# Patient Record
Sex: FEMALE | Race: OTHER | NOT HISPANIC OR LATINO | Employment: FULL TIME | URBAN - METROPOLITAN AREA
[De-identification: names, ages, dates, MRNs, and addresses within clinical notes are randomized per-mention and may not be internally consistent; named-entity substitution may affect disease eponyms.]

---

## 2023-02-24 PROBLEM — M79.89 SWELLING OF RIGHT HAND: Status: ACTIVE | Noted: 2023-02-24

## 2023-02-24 PROBLEM — R31.9 HEMATURIA: Status: ACTIVE | Noted: 2023-02-24

## 2023-02-24 PROBLEM — R09.81 NASAL CONGESTION: Status: ACTIVE | Noted: 2023-02-24

## 2023-02-24 PROBLEM — R06.7 SNEEZING: Status: ACTIVE | Noted: 2023-02-24

## 2023-02-24 PROBLEM — H90.3 BILATERAL SENSORINEURAL HEARING LOSS: Status: ACTIVE | Noted: 2023-02-24

## 2023-02-24 PROBLEM — R35.0 INCREASED FREQUENCY OF URINATION: Status: ACTIVE | Noted: 2023-02-24

## 2023-02-24 PROBLEM — M25.512 CHRONIC LEFT SHOULDER PAIN: Status: ACTIVE | Noted: 2023-02-24

## 2023-02-24 PROBLEM — F41.9 ANXIETY: Status: ACTIVE | Noted: 2023-02-24

## 2023-02-24 PROBLEM — G89.29 CHRONIC LEFT SHOULDER PAIN: Status: ACTIVE | Noted: 2023-02-24

## 2023-02-24 PROBLEM — L20.84 INTRINSIC ECZEMA: Status: ACTIVE | Noted: 2023-02-24

## 2023-02-24 PROBLEM — M62.838 MUSCLE SPASM: Status: ACTIVE | Noted: 2023-02-24

## 2023-02-24 PROBLEM — E55.9 VITAMIN D DEFICIENCY: Status: ACTIVE | Noted: 2023-02-24

## 2023-02-24 PROBLEM — H93.13 SUBJECTIVE TINNITUS OF BOTH EARS: Status: ACTIVE | Noted: 2023-02-24

## 2023-02-24 PROBLEM — M54.2 NECK PAIN: Status: ACTIVE | Noted: 2023-02-24

## 2023-02-24 PROBLEM — N89.8 VAGINAL LESION: Status: ACTIVE | Noted: 2023-02-24

## 2023-02-24 PROBLEM — M25.512 LEFT SHOULDER PAIN: Status: ACTIVE | Noted: 2023-02-24

## 2023-02-24 PROBLEM — M79.641 PAIN OF RIGHT HAND: Status: ACTIVE | Noted: 2023-02-24
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
## 2023-02-24 PROBLEM — R63.4 WEIGHT LOSS, UNINTENTIONAL: Status: ACTIVE | Noted: 2023-02-24

## 2023-02-24 PROBLEM — A60.00 GENITAL HERPES: Status: ACTIVE | Noted: 2023-02-24

## 2023-02-24 PROBLEM — D64.9 ANEMIA: Status: ACTIVE | Noted: 2023-02-24

## 2023-02-24 PROBLEM — N89.8 VAGINAL IRRITATION: Status: ACTIVE | Noted: 2023-02-24

## 2023-02-24 PROBLEM — M54.9 BACK PAIN: Status: ACTIVE | Noted: 2023-02-24

## 2023-02-24 PROBLEM — R05.9 COUGH: Status: ACTIVE | Noted: 2023-02-24

## 2023-02-24 PROBLEM — H91.93 HEARING LOSS, BILATERAL: Status: ACTIVE | Noted: 2023-02-24

## 2023-02-24 PROBLEM — R13.13 PHARYNGEAL DYSPHAGIA: Status: ACTIVE | Noted: 2023-02-24

## 2023-02-24 RX ORDER — TRETINOIN 0.25 MG/G
CREAM TOPICAL
COMMUNITY
End: 2023-11-30 | Stop reason: ALTCHOICE

## 2023-02-24 RX ORDER — BENZOYL PEROXIDE 50 MG/ML
LIQUID TOPICAL
COMMUNITY
Start: 2022-07-06 | End: 2023-11-30 | Stop reason: ALTCHOICE

## 2023-02-24 RX ORDER — CLINDAMYCIN PHOSPHATE 10 UG/ML
LOTION TOPICAL
COMMUNITY
End: 2023-12-06 | Stop reason: ALTCHOICE

## 2023-03-14 ENCOUNTER — OFFICE VISIT (OUTPATIENT)
Dept: PRIMARY CARE | Facility: CLINIC | Age: 23
End: 2023-03-14
Payer: COMMERCIAL

## 2023-03-14 ENCOUNTER — LAB (OUTPATIENT)
Dept: LAB | Facility: LAB | Age: 23
End: 2023-03-14
Payer: COMMERCIAL

## 2023-03-14 VITALS
BODY MASS INDEX: 20.81 KG/M2 | HEART RATE: 69 BPM | HEIGHT: 70 IN | TEMPERATURE: 97.4 F | WEIGHT: 145.4 LBS | SYSTOLIC BLOOD PRESSURE: 114 MMHG | DIASTOLIC BLOOD PRESSURE: 77 MMHG

## 2023-03-14 DIAGNOSIS — Z00.00 ANNUAL PHYSICAL EXAM: Primary | ICD-10-CM

## 2023-03-14 DIAGNOSIS — Z11.1 SCREENING-PULMONARY TB: ICD-10-CM

## 2023-03-14 DIAGNOSIS — Z00.00 ANNUAL PHYSICAL EXAM: ICD-10-CM

## 2023-03-14 PROCEDURE — 99395 PREV VISIT EST AGE 18-39: CPT | Performed by: INTERNAL MEDICINE

## 2023-03-14 PROCEDURE — 86481 TB AG RESPONSE T-CELL SUSP: CPT

## 2023-03-14 PROCEDURE — 36415 COLL VENOUS BLD VENIPUNCTURE: CPT

## 2023-03-14 RX ORDER — DOXYCYCLINE 50 MG/1
50 TABLET ORAL 2 TIMES DAILY
COMMUNITY
End: 2023-11-30 | Stop reason: ALTCHOICE

## 2023-03-14 ASSESSMENT — ENCOUNTER SYMPTOMS
POLYDIPSIA: 0
SHORTNESS OF BREATH: 0
COUGH: 0
PALPITATIONS: 0
FEVER: 0
CHILLS: 0

## 2023-03-14 NOTE — PROGRESS NOTES
Subjective   Patient ID: Sugey Fonseca is a 22 y.o. female who presents for Annual Exam.    HPI     Review of Systems    Objective   There were no vitals taken for this visit.    Physical Exam    Assessment/Plan

## 2023-03-14 NOTE — PROGRESS NOTES
Subjective   Patient ID: Sugey Fonseca is a 22 y.o. female who presents for Annual Exam.    22-year-old otherwise healthy female presents [facility] for care.  She has a history of major depressive disorder well-controlled without medication.  She says is managed primarily through counseling and talk therapy in the past and that at this time she is in a good state with it has no specific concerns.  I did advise her about means and ways that she can seek similar counseling for treatment options in the future should she have a need including online therapy and services provided through her school.  She is never been on medication in the past for the management of this condition.  She currently follows only with a dermatologist for the management of acne they provide all of her current medications.  She is in otherwise good health active without any symptoms of chronic pain.  As part of today's visit she has a physical form that needs to be completed for her [employer].  I reviewed the details of this form completed as part of today's visit.  This included updated her preventative health screening needs including vaccines and tuberculosis screening.  I counseled the patient about all of these above issues as well as her preventative health needs.    Patient presents today for an annual wellness exam    Medical history and surgical history updated today  Medication list reconciled and updated  Patient denies vision issues at this time  Patient denies hearing issues at this time  Current diet:  Current exercise habit:  Follows with a dentist: Yes    Patient counseled about available preventative health vaccinations and provided with opportunity to update them with our office or through prescription to preferred pharmacy.    Reviewed and discussed preventative health screening recommendations for colon cancer    Reviewed and discussed preventative health recommendations for screening for cervical cancer  "and breast cancer          Review of Systems   Constitutional:  Negative for chills and fever.   Respiratory:  Negative for cough and shortness of breath.    Cardiovascular:  Negative for chest pain and palpitations.   Endocrine: Negative for polydipsia and polyuria.       Objective   /77 (BP Location: Right arm, Patient Position: Sitting, BP Cuff Size: Adult)   Pulse 69   Temp 36.3 °C (97.4 °F) (Temporal)   Ht 1.765 m (5' 9.5\")   Wt 66 kg (145 lb 6.4 oz)   BMI 21.16 kg/m²     Physical Exam  Constitutional:       Appearance: Normal appearance.   HENT:      Head: Normocephalic and atraumatic.      Right Ear: Tympanic membrane normal.      Left Ear: Tympanic membrane normal.      Nose: Nose normal.      Mouth/Throat:      Mouth: Mucous membranes are moist.   Eyes:      Extraocular Movements: Extraocular movements intact.      Conjunctiva/sclera: Conjunctivae normal.      Pupils: Pupils are equal, round, and reactive to light.   Neck:      Thyroid: No thyroid mass, thyromegaly or thyroid tenderness.      Vascular: No carotid bruit.   Cardiovascular:      Rate and Rhythm: Normal rate and regular rhythm.      Heart sounds: No murmur heard.     No friction rub. No gallop.   Pulmonary:      Effort: Pulmonary effort is normal. No respiratory distress.      Breath sounds: No wheezing, rhonchi or rales.   Abdominal:      General: Bowel sounds are normal.      Palpations: Abdomen is soft.      Tenderness: There is no abdominal tenderness. There is no guarding.      Hernia: No hernia is present.   Musculoskeletal:      Cervical back: Neck supple. No tenderness.      Right lower leg: No edema.      Left lower leg: No edema.   Lymphadenopathy:      Cervical: No cervical adenopathy.   Skin:     Coloration: Skin is not jaundiced.   Neurological:      General: No focal deficit present.      Mental Status: She is alert and oriented to person, place, and time.   Psychiatric:         Mood and Affect: Mood normal. "         Assessment/Plan   Problem List Items Addressed This Visit    None  Visit Diagnoses       Annual physical exam    -  Primary    Relevant Orders    T-Spot TB    Screening-pulmonary TB        Relevant Orders    T-Spot TB

## 2023-03-16 LAB
NIL(NEG) CONTROL SPOT COUNT: NORMAL
PANEL A SPOT COUNT: 0
PANEL B SPOT COUNT: 0
POS CONTROL SPOT COUNT: NORMAL
T-SPOT. TB INTERPRETATION: NEGATIVE

## 2023-10-18 ENCOUNTER — APPOINTMENT (OUTPATIENT)
Dept: DERMATOLOGY | Facility: CLINIC | Age: 23
End: 2023-10-18
Payer: COMMERCIAL

## 2023-11-19 DIAGNOSIS — L70.0 ACNE VULGARIS: Primary | ICD-10-CM

## 2023-11-20 RX ORDER — DROSPIRENONE AND ETHINYL ESTRADIOL 0.02-3(28)
1 KIT ORAL DAILY
Qty: 84 TABLET | Refills: 1 | Status: SHIPPED | OUTPATIENT
Start: 2023-11-20 | End: 2023-11-30 | Stop reason: ALTCHOICE

## 2023-11-29 ENCOUNTER — APPOINTMENT (OUTPATIENT)
Dept: DERMATOLOGY | Facility: CLINIC | Age: 23
End: 2023-11-29
Payer: COMMERCIAL

## 2023-11-30 ENCOUNTER — OFFICE VISIT (OUTPATIENT)
Dept: OBSTETRICS AND GYNECOLOGY | Facility: CLINIC | Age: 23
End: 2023-11-30
Payer: COMMERCIAL

## 2023-11-30 VITALS
SYSTOLIC BLOOD PRESSURE: 109 MMHG | DIASTOLIC BLOOD PRESSURE: 64 MMHG | BODY MASS INDEX: 20.04 KG/M2 | HEIGHT: 70 IN | WEIGHT: 140 LBS

## 2023-11-30 DIAGNOSIS — N92.6 MENSTRUAL CYCLE DISORDER: Primary | ICD-10-CM

## 2023-11-30 PROCEDURE — 99213 OFFICE O/P EST LOW 20 MIN: CPT | Performed by: OBSTETRICS & GYNECOLOGY

## 2023-11-30 PROCEDURE — 1036F TOBACCO NON-USER: CPT | Performed by: OBSTETRICS & GYNECOLOGY

## 2023-11-30 RX ORDER — PRENATAL VIT NO.126/IRON/FOLIC 28MG-0.8MG
1 TABLET ORAL DAILY
COMMUNITY
Start: 2022-12-30 | End: 2024-04-23 | Stop reason: SDUPTHER

## 2023-11-30 RX ORDER — TRIAMCINOLONE ACETONIDE 1 MG/G
1 OINTMENT TOPICAL AS NEEDED
COMMUNITY
Start: 2023-08-18 | End: 2023-12-06 | Stop reason: SDUPTHER

## 2023-11-30 RX ORDER — IBUPROFEN 600 MG/1
600 TABLET ORAL AS NEEDED
COMMUNITY
Start: 2023-07-20

## 2023-11-30 RX ORDER — TRETINOIN 0.5 MG/G
1 CREAM TOPICAL NIGHTLY
COMMUNITY
Start: 2022-08-31

## 2023-11-30 ASSESSMENT — PAIN SCALES - GENERAL: PAINLEVEL: 0-NO PAIN

## 2023-12-01 NOTE — PROGRESS NOTES
Patient has been trying to conceive for the last 6 months that she has been unsuccessful so far previously she got pregnant with the same partner and the she uses pot but she does not use heavy drugs her partner apparently uses some additional drugs she is concerned that that may influence quality his sperm  We reviewed physiology of ovulation elements affecting quality of ovulation her cycle is regular once a month lasting for 3 to 5 days I asked patient to download data body temperature cain and start recording basal body temperature so we can review after 3 months of recording if she has the proper ovulation is good quality ovulation  Impression evaluation of fertility return in 3 to 4 months to review basal body temperature record

## 2023-12-02 ENCOUNTER — HOSPITAL ENCOUNTER (EMERGENCY)
Facility: HOSPITAL | Age: 23
Discharge: HOME | End: 2023-12-02
Attending: EMERGENCY MEDICINE
Payer: COMMERCIAL

## 2023-12-02 ENCOUNTER — APPOINTMENT (OUTPATIENT)
Dept: RADIOLOGY | Facility: HOSPITAL | Age: 23
End: 2023-12-02
Payer: COMMERCIAL

## 2023-12-02 VITALS
HEART RATE: 111 BPM | RESPIRATION RATE: 16 BRPM | WEIGHT: 140 LBS | HEIGHT: 69 IN | BODY MASS INDEX: 20.73 KG/M2 | SYSTOLIC BLOOD PRESSURE: 132 MMHG | DIASTOLIC BLOOD PRESSURE: 76 MMHG | OXYGEN SATURATION: 100 % | TEMPERATURE: 97.7 F

## 2023-12-02 DIAGNOSIS — S16.1XXA CERVICAL STRAIN, ACUTE, INITIAL ENCOUNTER: Primary | ICD-10-CM

## 2023-12-02 LAB — PREGNANCY TEST URINE, POC: NEGATIVE

## 2023-12-02 PROCEDURE — 99284 EMERGENCY DEPT VISIT MOD MDM: CPT | Performed by: EMERGENCY MEDICINE

## 2023-12-02 PROCEDURE — 72125 CT NECK SPINE W/O DYE: CPT | Performed by: RADIOLOGY

## 2023-12-02 PROCEDURE — 70450 CT HEAD/BRAIN W/O DYE: CPT | Performed by: RADIOLOGY

## 2023-12-02 PROCEDURE — 72128 CT CHEST SPINE W/O DYE: CPT | Performed by: RADIOLOGY

## 2023-12-02 PROCEDURE — 99285 EMERGENCY DEPT VISIT HI MDM: CPT | Mod: 25 | Performed by: EMERGENCY MEDICINE

## 2023-12-02 PROCEDURE — 72128 CT CHEST SPINE W/O DYE: CPT

## 2023-12-02 PROCEDURE — 70450 CT HEAD/BRAIN W/O DYE: CPT

## 2023-12-02 PROCEDURE — 71046 X-RAY EXAM CHEST 2 VIEWS: CPT | Performed by: RADIOLOGY

## 2023-12-02 PROCEDURE — 81025 URINE PREGNANCY TEST: CPT

## 2023-12-02 PROCEDURE — 71046 X-RAY EXAM CHEST 2 VIEWS: CPT

## 2023-12-02 PROCEDURE — 72125 CT NECK SPINE W/O DYE: CPT

## 2023-12-02 PROCEDURE — 2500000005 HC RX 250 GENERAL PHARMACY W/O HCPCS: Mod: SE

## 2023-12-02 RX ORDER — ONDANSETRON 4 MG/1
8 TABLET, ORALLY DISINTEGRATING ORAL ONCE
Status: COMPLETED | OUTPATIENT
Start: 2023-12-02 | End: 2023-12-02

## 2023-12-02 RX ORDER — ACETAMINOPHEN 500 MG
1000 TABLET ORAL EVERY 6 HOURS PRN
Qty: 30 TABLET | Refills: 0 | Status: SHIPPED | OUTPATIENT
Start: 2023-12-02 | End: 2023-12-12

## 2023-12-02 RX ORDER — ACETAMINOPHEN 325 MG/1
650 TABLET ORAL ONCE
Status: COMPLETED | OUTPATIENT
Start: 2023-12-02 | End: 2023-12-02

## 2023-12-02 RX ADMIN — ACETAMINOPHEN 650 MG: 325 TABLET ORAL at 02:37

## 2023-12-02 RX ADMIN — ACETAMINOPHEN 650 MG: 325 TABLET ORAL at 10:10

## 2023-12-02 RX ADMIN — ONDANSETRON 8 MG: 4 TABLET, ORALLY DISINTEGRATING ORAL at 04:28

## 2023-12-02 ASSESSMENT — PAIN - FUNCTIONAL ASSESSMENT: PAIN_FUNCTIONAL_ASSESSMENT: 0-10

## 2023-12-02 ASSESSMENT — LIFESTYLE VARIABLES
HAVE PEOPLE ANNOYED YOU BY CRITICIZING YOUR DRINKING: NO
EVER HAD A DRINK FIRST THING IN THE MORNING TO STEADY YOUR NERVES TO GET RID OF A HANGOVER: NO
EVER FELT BAD OR GUILTY ABOUT YOUR DRINKING: NO
REASON UNABLE TO ASSESS: NO
HAVE YOU EVER FELT YOU SHOULD CUT DOWN ON YOUR DRINKING: NO

## 2023-12-02 ASSESSMENT — PAIN SCALES - GENERAL
PAINLEVEL_OUTOF10: 10 - WORST POSSIBLE PAIN
PAINLEVEL_OUTOF10: 10 - WORST POSSIBLE PAIN

## 2023-12-02 ASSESSMENT — PAIN DESCRIPTION - LOCATION: LOCATION: NECK

## 2023-12-02 ASSESSMENT — COLUMBIA-SUICIDE SEVERITY RATING SCALE - C-SSRS
1. IN THE PAST MONTH, HAVE YOU WISHED YOU WERE DEAD OR WISHED YOU COULD GO TO SLEEP AND NOT WAKE UP?: NO
2. HAVE YOU ACTUALLY HAD ANY THOUGHTS OF KILLING YOURSELF?: NO
6. HAVE YOU EVER DONE ANYTHING, STARTED TO DO ANYTHING, OR PREPARED TO DO ANYTHING TO END YOUR LIFE?: NO

## 2023-12-02 ASSESSMENT — PAIN DESCRIPTION - PAIN TYPE: TYPE: ACUTE PAIN
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
## 2023-12-02 NOTE — DISCHARGE INSTRUCTIONS
Please follow up with PCP. Please return if [facility]. Take tylenol as needed for pain control
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
## 2023-12-02 NOTE — Clinical Note
Sugey Fonseca was seen and treated in our emergency department on 12/2/2023.  She may return to work on 12/03/2023.       If you have any questions or concerns, please don't hesitate to call.      Nicole Minor, DO
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
## 2023-12-06 ENCOUNTER — TELEPHONE (OUTPATIENT)
Dept: DERMATOLOGY | Facility: CLINIC | Age: 23
End: 2023-12-06

## 2023-12-06 ENCOUNTER — TELEMEDICINE (OUTPATIENT)
Dept: DERMATOLOGY | Facility: CLINIC | Age: 23
End: 2023-12-06
Payer: COMMERCIAL

## 2023-12-06 DIAGNOSIS — L70.0 ACNE VULGARIS: Primary | ICD-10-CM

## 2023-12-06 DIAGNOSIS — L30.9 ECZEMA, UNSPECIFIED TYPE: ICD-10-CM

## 2023-12-06 PROCEDURE — 99214 OFFICE O/P EST MOD 30 MIN: CPT | Performed by: DERMATOLOGY

## 2023-12-06 RX ORDER — TAZAROTENE 1 MG/G
CREAM TOPICAL
Qty: 60 G | Refills: 2 | Status: SHIPPED | OUTPATIENT
Start: 2023-12-06

## 2023-12-06 RX ORDER — CLINDAMYCIN PHOSPHATE 10 UG/ML
LOTION TOPICAL DAILY
Qty: 60 ML | Refills: 2 | Status: SHIPPED | OUTPATIENT
Start: 2023-12-06 | End: 2024-12-05

## 2023-12-06 RX ORDER — TRIAMCINOLONE ACETONIDE 1 MG/G
1 OINTMENT TOPICAL 2 TIMES DAILY
Qty: 80 G | Refills: 2 | Status: SHIPPED | OUTPATIENT
Start: 2023-12-06

## 2023-12-06 RX ORDER — SPIRONOLACTONE 25 MG/1
TABLET ORAL
Qty: 60 TABLET | Refills: 2 | Status: SHIPPED | OUTPATIENT
Start: 2023-12-06

## 2023-12-06 RX ORDER — BENZOYL PEROXIDE 50 MG/ML
LIQUID TOPICAL
Qty: 227 G | Refills: 11 | Status: SHIPPED | OUTPATIENT
Start: 2023-12-06

## 2023-12-07 NOTE — TELEPHONE ENCOUNTER
During visit today, patient reported that she is on OCP and not trying to conceive. Per last gynecology note on 11/30/23 patient has been actively trying to conceive for the last 6 months.     Called patient 12/6/23. Patient again states that she is taking OCP and not trying to conceive. Discussed that in the future we recommend stopping spironolactone 2-3 months prior to attempting to conceive as it can cause feminization features in male fetus. Also discussed that tazarotene is contraindicated during pregnancy. Patient expressed understanding.     Kalani Jacobson, DO

## 2023-12-07 NOTE — PROGRESS NOTES
Subjective     Sugey Fonseca is a 23 y.o. female who presents for the following: Acne.     Virtual visit for follow-up of acne vulgaris. Patient states she is currently on BP 5% wash qAM, clindamycin 1% lotion, tazarotene 0.1% cream, and oral contraceptive pill. Patient reports some improvement in her acne, but still frequent flaring on her cheeks/jawline. Noticed improvement with OCP prescribed last visit (6/2023). Patient currently not trying to conceive.     Review of Systems:  No other skin or systemic complaints other than what is documented elsewhere in the note.    The following portions of the chart were reviewed this encounter and updated as appropriate:   Tobacco  Allergies  Meds  Problems  Med Hx  Surg Hx  Fam Hx  Soc   Hx        Skin Cancer History  No skin cancer on file.      Specialty Problems          Dermatology Problems    Intrinsic eczema        Objective   Well appearing patient in no apparent distress; mood and affect are within normal limits.    A focused skin examination was performed. All findings within normal limits unless otherwise noted below.    Assessment/Plan   1. Acne vulgaris  Multiple inflammatory papules and papulopustules on face.     Acne vulgaris, moderate inflammatory with hormonal component- Face  Status: Chronic condition is not at desired level of control     -Continue tazarotene 0.1% cream. Apply a pea-sized amount to the entire face every night. Reviewed side effects including dryness/irritation.   -Continue BP 5% wash qAM. Be careful - this can bleach!   -Continue clindamycin 1% lotion qAM.   -START spironolactone 25mg once daily x 2 weeks then if no side effects can increase to 50mg once daily. Discussed side effects including lightheadedness/dizziness, increased urination, breast tenderness, cramping, and menstrual cycle irregularities. Patient states she is currently not trying to conceive and is on birth control.   -Continue OCP per gynecology.     Related  Procedures  Follow Up In Dermatology - Established Patient    Related Medications  clindamycin (Cleocin T) 1 % lotion  Apply topically once daily. Every morning as spot treatment to active acne spots.    tazarotene (Tazorac) 0.1 % cream  Apply a pea sized amount to entire face every night.    benzoyl peroxide 5 % external wash  Dispense 240 mL. Use as a face wash every morning.    spironolactone (Aldactone) 25 mg tablet  Take one tablet once daily x 2 weeks. If tolerating, increase to 2 tablets once daily.    2. Eczema, unspecified type  No active eczema.     -START triamcinolone 0.1% ointment BID x 2 weeks for flares. Discussed risks, benefits, and side effects of topical steroid overuse including erythema, telangiectasia, and atrophy.     Related Medications  triamcinolone (Kenalog) 0.1 % ointment  Apply 1 Application topically 2 times a day. For up to 2 weeks.      RTC in 2-3 months VV for follow up of acne vulgaris.     Kalani Jacobson,      I saw and evaluated the patient. I personally obtained the key and critical portions of the history and physical exam or was physically present for key and critical portions performed by the resident/fellow. I reviewed the resident/fellow's documentation and discussed the patient with the resident/fellow. I agree with the resident/fellow's medical decision making as documented in the note and made changes where appropriate.     Aliyah Roberson MD

## 2023-12-11 ENCOUNTER — APPOINTMENT (OUTPATIENT)
Dept: AUDIOLOGY | Facility: HOSPITAL | Age: 23
End: 2023-12-11
Payer: MEDICARE

## 2023-12-12 ENCOUNTER — APPOINTMENT (OUTPATIENT)
Dept: AUDIOLOGY | Facility: HOSPITAL | Age: 23
End: 2023-12-12
Payer: MEDICARE

## 2023-12-18 ENCOUNTER — APPOINTMENT (OUTPATIENT)
Dept: AUDIOLOGY | Facility: HOSPITAL | Age: 23
End: 2023-12-18
Payer: MEDICARE

## 2023-12-21 ENCOUNTER — APPOINTMENT (OUTPATIENT)
Dept: PRIMARY CARE | Facility: CLINIC | Age: 23
End: 2023-12-21
Payer: COMMERCIAL

## 2024-01-04 ENCOUNTER — APPOINTMENT (OUTPATIENT)
Dept: PRIMARY CARE | Facility: CLINIC | Age: 24
End: 2024-01-04
Payer: COMMERCIAL

## 2024-01-04 ENCOUNTER — OFFICE VISIT (OUTPATIENT)
Dept: PRIMARY CARE | Facility: CLINIC | Age: 24
End: 2024-01-04
Payer: COMMERCIAL

## 2024-01-04 VITALS
HEIGHT: 69 IN | WEIGHT: 137 LBS | BODY MASS INDEX: 20.29 KG/M2 | SYSTOLIC BLOOD PRESSURE: 92 MMHG | DIASTOLIC BLOOD PRESSURE: 58 MMHG | HEART RATE: 84 BPM

## 2024-01-04 DIAGNOSIS — M25.312 INSTABILITY OF BOTH SHOULDER JOINTS: ICD-10-CM

## 2024-01-04 DIAGNOSIS — S06.0X0A CONCUSSION WITHOUT LOSS OF CONSCIOUSNESS, INITIAL ENCOUNTER: Primary | ICD-10-CM

## 2024-01-04 DIAGNOSIS — M25.311 INSTABILITY OF BOTH SHOULDER JOINTS: ICD-10-CM

## 2024-01-04 DIAGNOSIS — M25.532 LEFT WRIST PAIN: ICD-10-CM

## 2024-01-04 PROCEDURE — 1036F TOBACCO NON-USER: CPT | Performed by: FAMILY MEDICINE

## 2024-01-04 PROCEDURE — 99213 OFFICE O/P EST LOW 20 MIN: CPT | Performed by: FAMILY MEDICINE

## 2024-01-04 RX ORDER — CYCLOBENZAPRINE HCL 10 MG
10 TABLET ORAL NIGHTLY PRN
Qty: 30 TABLET | Refills: 0 | Status: SHIPPED | OUTPATIENT
Start: 2024-01-04 | End: 2024-03-04
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
## 2024-01-04 NOTE — LETTER
January 4, 2024     Patient: Sugey Fonseca   YOB: 2000   Date of Visit: 1/4/2024       To Whom It May Concern:    Sugey Fonseca was seen in my clinic on 1/4/2024 at 4:00 pm. Please excuse Sugey for her absence from work on this day to make the appointment.    Sugey was seen today in concussion clinic. Recommend no lifting more than 20 lbs for next 3 weeks. She will follow up in 3 weeks.     If you have any questions or concerns, please don't hesitate to call.         Sincerely,         Timo Greenwood, DO        CC: No Recipients

## 2024-01-09 ENCOUNTER — CLINICAL SUPPORT (OUTPATIENT)
Dept: AUDIOLOGY | Facility: HOSPITAL | Age: 24
End: 2024-01-09
Payer: COMMERCIAL

## 2024-01-09 DIAGNOSIS — H90.3 BILATERAL SENSORINEURAL HEARING LOSS: Primary | ICD-10-CM

## 2024-01-09 PROCEDURE — 92550 TYMPANOMETRY & REFLEX THRESH: CPT

## 2024-01-09 PROCEDURE — 92557 COMPREHENSIVE HEARING TEST: CPT

## 2024-01-09 NOTE — PROGRESS NOTES
AUDIOLOGY EVALUATION    Name:  Sugey Fonseca  :  2000  Age:  23 y.o.  Date of Evaluation:  2024    IMPRESSIONS     Today's test results revealed normal middle ear functioning with mild sloping to moderate sensorineural hearing loss in both ears. Her hearing remains stable.    Sugey was counseled that in order to replace the right hearing aid through [employer] L&D claim, she will need to submit payment of $160. She noted that she would like to call to pay this over the phone once she is able to. She was provided with a copy of her hearing test and Audiology Scheduling line in order to complete this. She should also schedule the hearing aid re-fitting at that time as well. Sugey reported understanding.     RECOMMENDATIONS     Continue medical follow up with PCP as directed.  Call to submit payment for replacement hearing aid and schedule hearing aid re-fitting.  Annual hearing tests.     Time: 4227-8146    HISTORY     Sugey Fonseca, 23 year old female, was seen today for an updated hearing evaluation. Sugey has known bilateral sensorineural hearing loss and was fit with hearing aids through  Audiology in 2021. Today she reported that she has lost the right hearing aid and would like to replace it.     Sugey endorsed a slight decrease in hearing sensitivity in both ears. She noted occasional, bilateral tinnitus that is not bothersome. She denied dizziness, aural fullness, recent ear infections, otalgia, otorrhea, history of otologic surgeries or history of loud noise exposure.    Hearing aid information:   RIGHT: Phonak Bolero B30-M SN: [id number] with size 0 tubing and medium closed domes  LEFT: Phonak Bolero B30-M SN: [id number] with size 0 tubing and medium closed domes  Fitting date: 2021   Warranty expiration: 2026   L/D claim used on LEFT hearing aid 2022.       EVALUATION         TEST RESULTS     Otoscopic Evaluation:  Right Ear: Clear ear canal with visible tympanic  membrane.   Left Ear: Clear ear canal with visible tympanic membrane.    Tympanometry:   Right Ear: Normal, type A tympanogram with normal ear canal volume, peak pressure and compliance.   Left Ear: Normal, type A tympanogram with normal ear canal volume, peak pressure and compliance.     Ipsilateral Acoustic Reflexes:   Right Ear: Present 500-4000 Hz.   Left Ear: Present 500-4000 Hz.     Pure Tone Audiometry:    Right Ear: Mild sloping to moderate sensorineural hearing loss (125-8000 Hz).   Left Ear: Mild sloping to moderate sensorineural hearing loss (125-8000 Hz).     Speech Audiometry:   Right Ear:  Speech Reception Threshold (SRT) was obtained at 35 dBHL. Word Recognition scores were good in quiet when words were presented at 80 dBHL.  Left Ear:  Speech Reception Threshold (SRT) was obtained at 35 dBHL. Word Recognition scores were good in quiet when words were presented at 80 dBHL.    HEBERT Fischer, CCC-A  Licensed Clinical Audiologist    Degree of Hearing Sensitivity Decibel Range   Within Normal Limits (WNL) 0-25   Mild 26-40   Moderate 41-55   Moderately-Severe 56-70   Severe 71-90   Profound 91+      Key   CNT/DNT Could Not Test/Did Not Test   TM Tympanic Membrane   WNL Within Normal Limits   HA Hearing Aid   SNHL Sensorineural Hearing Loss   CHL Conductive Hearing Loss   NIHL Noise-Induced Hearing Loss   ECV Ear Canal Volume   MLV Monitored Live Voice
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
## 2024-01-22 ENCOUNTER — EVALUATION (OUTPATIENT)
Dept: PHYSICAL THERAPY | Facility: CLINIC | Age: 24
End: 2024-01-22
Payer: COMMERCIAL

## 2024-01-22 DIAGNOSIS — M25.311 INSTABILITY OF BOTH SHOULDER JOINTS: ICD-10-CM

## 2024-01-22 DIAGNOSIS — M25.312 INSTABILITY OF BOTH SHOULDER JOINTS: ICD-10-CM

## 2024-01-22 DIAGNOSIS — S06.0X0D CONCUSSION WITHOUT LOSS OF CONSCIOUSNESS, SUBSEQUENT ENCOUNTER: Primary | ICD-10-CM

## 2024-01-22 DIAGNOSIS — M54.2 CERVICALGIA: ICD-10-CM

## 2024-01-22 DIAGNOSIS — M25.512 CHRONIC PAIN OF BOTH SHOULDERS: ICD-10-CM

## 2024-01-22 DIAGNOSIS — G89.29 CHRONIC PAIN OF BOTH SHOULDERS: ICD-10-CM

## 2024-01-22 DIAGNOSIS — M25.511 CHRONIC PAIN OF BOTH SHOULDERS: ICD-10-CM

## 2024-01-22 PROCEDURE — 97161 PT EVAL LOW COMPLEX 20 MIN: CPT | Mod: GP | Performed by: PHYSICAL THERAPIST

## 2024-01-22 PROCEDURE — 97110 THERAPEUTIC EXERCISES: CPT | Mod: GP | Performed by: PHYSICAL THERAPIST

## 2024-01-22 ASSESSMENT — ENCOUNTER SYMPTOMS
LOSS OF SENSATION IN FEET: 0
OCCASIONAL FEELINGS OF UNSTEADINESS: 0
DEPRESSION: 0

## 2024-01-22 ASSESSMENT — PAIN - FUNCTIONAL ASSESSMENT: PAIN_FUNCTIONAL_ASSESSMENT: 0-10

## 2024-01-31 NOTE — PROGRESS NOTES
"Physical Therapy    Physical Therapy Treatment    Patient Name: Sugey Fonseca  MRN: [id number]  Today's Date: 2/1/2024         Assessment:   Patient struggled with scapular retraction and tends to substitute upper traps and elevate her shoulders for UE scapular stability.    Plan:   Add horizontal shoulder abduction and IR and ER of the shoulder    Current Problem  1. Concussion without loss of consciousness, subsequent encounter  Follow Up In Physical Therapy      2. Instability of both shoulder joints  Follow Up In Physical Therapy      3. Cervicalgia  Follow Up In Physical Therapy      4. Chronic pain of both shoulders  Follow Up In Physical Therapy          General     General  General Comment: visit 2    Subjective    Patient reports that her shoulders are feeling better.      Pain  Pain Assessment  Pain Assessment: 0-10  Pain Score:  (6/10 verbal description \"tense and tight not sharp or stabbing\")    Objective     Treatments:  2/1/2024:  High rows red 10 times 1 set *   Shoulder ext red 10 times 1 set *  Rows red 10 times 1 set *   Trial of horizontal abduction of shoulder but poor performance  Anti rotation punches green 10 times 1 set *   Short arch flexion 10 times 1 set *     EVAL:  Chin tucks 10 times 1 set *   Scapular retraction supine 10 times 1 set *   Lateral cervical flexion 3 times 30\" *  Levator scap 3 times 30\" *   Sleeper stretch 3 times 30\" *    OP EDUCATION:       Goals:  Active       PT Problem       Patient decreased neck disability index score to 5 points for self reported decline in symptoms.        Start:  01/22/24    Expected End:  04/21/24            Patient quick DASH score decrease to 16 points to allow for improved functional tasks       Start:  01/22/24    Expected End:  04/21/24            Patient will increase cervical lateral flexion to 0-40 degrees to allow the patient to scan environment without pain.        Start:  01/22/24    Expected End:  04/21/24            Patient will " increase shoulder flexion to 4-/5  to allow overhead reach without pain.        Start:  01/22/24    Expected End:  04/21/24            Patient will be independent in Home Exercise Program to manage symptoms and maximize their functional independence.        Start:  01/22/24    Expected End:  04/21/24            Patient Stated Goal 1: to get rid of my pain       Start:  01/22/24    Expected End:  04/21/24

## 2024-02-01 ENCOUNTER — TREATMENT (OUTPATIENT)
Dept: PHYSICAL THERAPY | Facility: CLINIC | Age: 24
End: 2024-02-01
Payer: COMMERCIAL

## 2024-02-01 DIAGNOSIS — M25.512 CHRONIC PAIN OF BOTH SHOULDERS: ICD-10-CM

## 2024-02-01 DIAGNOSIS — M25.311 INSTABILITY OF BOTH SHOULDER JOINTS: ICD-10-CM

## 2024-02-01 DIAGNOSIS — M25.511 CHRONIC PAIN OF BOTH SHOULDERS: ICD-10-CM

## 2024-02-01 DIAGNOSIS — S06.0X0D CONCUSSION WITHOUT LOSS OF CONSCIOUSNESS, SUBSEQUENT ENCOUNTER: ICD-10-CM

## 2024-02-01 DIAGNOSIS — M54.2 CERVICALGIA: ICD-10-CM

## 2024-02-01 DIAGNOSIS — G89.29 CHRONIC PAIN OF BOTH SHOULDERS: ICD-10-CM

## 2024-02-01 DIAGNOSIS — M25.312 INSTABILITY OF BOTH SHOULDER JOINTS: ICD-10-CM

## 2024-02-01 PROCEDURE — 97110 THERAPEUTIC EXERCISES: CPT | Mod: GP | Performed by: PHYSICAL THERAPIST

## 2024-02-01 ASSESSMENT — PAIN - FUNCTIONAL ASSESSMENT: PAIN_FUNCTIONAL_ASSESSMENT: 0-10

## 2024-02-07 ENCOUNTER — DOCUMENTATION (OUTPATIENT)
Dept: PHYSICAL THERAPY | Facility: CLINIC | Age: 24
End: 2024-02-07
Payer: COMMERCIAL

## 2024-02-21 ENCOUNTER — DOCUMENTATION (OUTPATIENT)
Dept: PHYSICAL THERAPY | Facility: CLINIC | Age: 24
End: 2024-02-21
Payer: COMMERCIAL

## 2024-02-21 NOTE — PROGRESS NOTES
Physical Therapy                 Therapy Communication Note    Patient Name: Sugey Fonseca  MRN: [id number]  Today's Date: 2/21/2024     Discipline: Physical Therapy    Missed Visit Reason:      Missed Time: No Show    Comment:  Called patient and she stated that she was unaware she had any appointment this date and was at work.  Patient reminded of next appointment and informed that if she missed the appointment she will be discharged from therapy.

## 2024-02-28 ENCOUNTER — DOCUMENTATION (OUTPATIENT)
Dept: PHYSICAL THERAPY | Facility: CLINIC | Age: 24
End: 2024-02-28

## 2024-02-29 NOTE — PROGRESS NOTES
Physical Therapy    Discharge Summary    Name: Sugey Fonseca  MRN: [id number]  : 2000  Date: 23    Discharge Summary: PT    Discharge Information: Date of discharge 2024 and Date of last visit 2024      Rehab Discharge Reason:  Patient with 3 no show appointment.  Patient was called and informed of discharge if she did not make her next appointment.  Patient no call no show again this date.  Patient discharged due to attendance.

## 2024-03-06 ENCOUNTER — APPOINTMENT (OUTPATIENT)
Dept: PHYSICAL THERAPY | Facility: CLINIC | Age: 24
End: 2024-03-06
Payer: COMMERCIAL

## 2024-03-13 ENCOUNTER — APPOINTMENT (OUTPATIENT)
Dept: PHYSICAL THERAPY | Facility: CLINIC | Age: 24
End: 2024-03-13
Payer: COMMERCIAL

## 2024-03-20 ENCOUNTER — APPOINTMENT (OUTPATIENT)
Dept: PHYSICAL THERAPY | Facility: CLINIC | Age: 24
End: 2024-03-20
Payer: COMMERCIAL

## 2024-03-27 ENCOUNTER — APPOINTMENT (OUTPATIENT)
Dept: PHYSICAL THERAPY | Facility: CLINIC | Age: 24
End: 2024-03-27
Payer: COMMERCIAL

## 2024-04-01 ENCOUNTER — LAB REQUISITION (OUTPATIENT)
Dept: LAB | Facility: HOSPITAL | Age: 24
End: 2024-04-01
Payer: COMMERCIAL

## 2024-04-01 DIAGNOSIS — R30.0 DYSURIA: ICD-10-CM

## 2024-04-01 PROCEDURE — 87186 SC STD MICRODIL/AGAR DIL: CPT

## 2024-04-01 PROCEDURE — 87661 TRICHOMONAS VAGINALIS AMPLIF: CPT

## 2024-04-01 PROCEDURE — 87086 URINE CULTURE/COLONY COUNT: CPT

## 2024-04-01 PROCEDURE — 87800 DETECT AGNT MULT DNA DIREC: CPT

## 2024-04-02 LAB
C TRACH RRNA SPEC QL NAA+PROBE: NEGATIVE
N GONORRHOEA DNA SPEC QL PROBE+SIG AMP: NEGATIVE
T VAGINALIS RRNA SPEC QL NAA+PROBE: NEGATIVE

## 2024-04-03 ENCOUNTER — APPOINTMENT (OUTPATIENT)
Dept: PHYSICAL THERAPY | Facility: CLINIC | Age: 24
End: 2024-04-03
Payer: COMMERCIAL

## 2024-04-04 LAB — BACTERIA UR CULT: ABNORMAL

## 2024-04-23 ENCOUNTER — TELEPHONE (OUTPATIENT)
Dept: OBSTETRICS AND GYNECOLOGY | Facility: HOSPITAL | Age: 24
End: 2024-04-23

## 2024-04-23 DIAGNOSIS — Z78.9 ATTEMPTING TO CONCEIVE: Primary | ICD-10-CM

## 2024-04-23 RX ORDER — PRENATAL VIT NO.126/IRON/FOLIC 28MG-0.8MG
1 TABLET ORAL DAILY
Qty: 30 TABLET | Refills: 0 | Status: SHIPPED | OUTPATIENT
Start: 2024-04-23

## 2024-04-23 NOTE — TELEPHONE ENCOUNTER
ARVIN  DR cat CURRY   Who are you speaking with? Patient   If it is not the patient, are they listed on an active communication consent form? Yes   Is this a ARVIN or DR cat CURRY ARVIN   Which provider is patient currently scheduled or established with? Ida Booker PA-C   What is the original appointment date and time? 8/2/23   At which location is the appointment scheduled to take place? [facility]   Which provider is the patient transitioning care to? Kira Cardoza PA-C   What is the new appointment date and time? 8/7/23   At which location is the new appointment scheduled to take place? [facility]   What is the reason for this change?  Debo no longer at the location Fax received from patient's pharmacy requesting refill on prenatal vitamins  [facility]  [facility] not seen by [facility] since 12/2022  Patient states she is trying to conceive and would like a refill for prenatals  Patient scheduled for visit with Dr. Angel 5/14  Request sent to [facility] for approval of med  Medication sent in, patient aware  Encouraged patient to ask for additional refills at appointment once she discusses her questions and concerns with Dr. Angel  Patient verbalized understanding  Encouraged patient to call office with any future questions or concerns  Zena Lal RN

## 2024-05-14 ENCOUNTER — APPOINTMENT (OUTPATIENT)
Dept: OBSTETRICS AND GYNECOLOGY | Facility: CLINIC | Age: 24
End: 2024-05-14
Payer: COMMERCIAL

## 2024-05-19 NOTE — PROGRESS NOTES
Physical Therapy                 Therapy Communication Note    Patient Name: Sugey Fonseca  MRN: [id number]  Today's Date: 2/7/2024     Discipline: Physical Therapy    Missed Visit Reason:      Missed Time: No Show    Comment:  
(0) No abnormality

## 2024-06-11 ENCOUNTER — APPOINTMENT (OUTPATIENT)
Dept: OBSTETRICS AND GYNECOLOGY | Facility: CLINIC | Age: 24
End: 2024-06-11
Payer: COMMERCIAL

## 2024-06-14 ENCOUNTER — APPOINTMENT (OUTPATIENT)
Dept: OBSTETRICS AND GYNECOLOGY | Facility: CLINIC | Age: 24
End: 2024-06-14
Payer: COMMERCIAL

## 2024-06-19 ENCOUNTER — APPOINTMENT (OUTPATIENT)
Dept: DERMATOLOGY | Facility: CLINIC | Age: 24
End: 2024-06-19
Payer: COMMERCIAL

## 2024-06-20 ENCOUNTER — APPOINTMENT (OUTPATIENT)
Dept: DERMATOLOGY | Facility: CLINIC | Age: 24
End: 2024-06-20
Payer: COMMERCIAL

## 2024-06-21 ENCOUNTER — OFFICE VISIT (OUTPATIENT)
Dept: OBSTETRICS AND GYNECOLOGY | Facility: CLINIC | Age: 24
End: 2024-06-21
Payer: COMMERCIAL

## 2024-06-21 VITALS
DIASTOLIC BLOOD PRESSURE: 55 MMHG | HEIGHT: 69 IN | BODY MASS INDEX: 20.47 KG/M2 | SYSTOLIC BLOOD PRESSURE: 126 MMHG | WEIGHT: 138.2 LBS

## 2024-06-21 DIAGNOSIS — Z31.69 INFERTILITY COUNSELING: ICD-10-CM

## 2024-06-21 DIAGNOSIS — Z01.419 WOMEN'S ANNUAL ROUTINE GYNECOLOGICAL EXAMINATION: Primary | ICD-10-CM

## 2024-06-21 DIAGNOSIS — Z01.419 PAP TEST, AS PART OF ROUTINE GYNECOLOGICAL EXAMINATION: ICD-10-CM

## 2024-06-21 PROCEDURE — 99385 PREV VISIT NEW AGE 18-39: CPT | Performed by: OBSTETRICS & GYNECOLOGY

## 2024-06-21 PROCEDURE — 1036F TOBACCO NON-USER: CPT | Performed by: OBSTETRICS & GYNECOLOGY

## 2024-06-21 ASSESSMENT — PATIENT HEALTH QUESTIONNAIRE - PHQ9
10. IF YOU CHECKED OFF ANY PROBLEMS, HOW DIFFICULT HAVE THESE PROBLEMS MADE IT FOR YOU TO DO YOUR WORK, TAKE CARE OF THINGS AT HOME, OR GET ALONG WITH OTHER PEOPLE: SOMEWHAT DIFFICULT
1. LITTLE INTEREST OR PLEASURE IN DOING THINGS: SEVERAL DAYS
SUM OF ALL RESPONSES TO PHQ9 QUESTIONS 1 AND 2: 2
2. FEELING DOWN, DEPRESSED OR HOPELESS: SEVERAL DAYS

## 2024-06-21 ASSESSMENT — ENCOUNTER SYMPTOMS
ENDOCRINE NEGATIVE: 0
ALLERGIC/IMMUNOLOGIC NEGATIVE: 0
CONSTITUTIONAL NEGATIVE: 0
RESPIRATORY NEGATIVE: 0
PSYCHIATRIC NEGATIVE: 0
NEUROLOGICAL NEGATIVE: 0
HEMATOLOGIC/LYMPHATIC NEGATIVE: 0
GASTROINTESTINAL NEGATIVE: 0
MUSCULOSKELETAL NEGATIVE: 0
EYES NEGATIVE: 0
CARDIOVASCULAR NEGATIVE: 0

## 2024-06-21 ASSESSMENT — PAIN SCALES - GENERAL: PAINLEVEL: 0-NO PAIN

## 2024-06-21 NOTE — PROGRESS NOTES
"Sugey Fonseca is a 23 y.o. female who is here for a routine exam. PCP = GILA Chowatient here for annual exam.  Sexually active with steady partner not using birth control.  Patient has been attempting pregnancy since 2022 when she had a laparoscopy for ectopic pregnancy.  Still has same steady partner.  Has 1 child which she fathered.  Patient notes her cycles are very regular 20 days apart lasting 4 to 5 days.  Having sex at least 3 times per week    Review of Systems  Systems all negative    Physical Exam  Constitutional:       Appearance: Normal appearance. She is normal weight.   Genitourinary:      Genitourinary Comments: External genitalia unremarkable  Vagina clear  Cervix closed uterus normal anteverted  Adnexa without masses or tenderness  Perineum without lesions or swelling    Breast without masses tenderness or nipple discharge, normal appearance   Breasts:     Breasts are soft.     Right: Normal.      Left: Normal.   HENT:      Head: Normocephalic.      Nose: Nose normal.   Eyes:      Pupils: Pupils are equal, round, and reactive to light.   Cardiovascular:      Rate and Rhythm: Normal rate and regular rhythm.   Pulmonary:      Effort: Pulmonary effort is normal.      Breath sounds: Normal breath sounds.   Abdominal:      General: Abdomen is flat. Bowel sounds are normal.      Palpations: Abdomen is soft.   Musculoskeletal:         General: Normal range of motion.      Cervical back: Normal range of motion and neck supple.   Neurological:      General: No focal deficit present.      Mental Status: She is alert.   Skin:     General: Skin is warm and dry.   Psychiatric:         Mood and Affect: Mood normal.         Behavior: Behavior normal.         Thought Content: Thought content normal.         Judgment: Judgment normal.         Objective   /55   Ht 1.753 m (5' 9\")   Wt 62.7 kg (138 lb 3.2 oz)   LMP 2024   BMI 20.41 kg/m²   OB History          2    Para   1 "    Term   1            AB   1    Living   1         SAB   1    IAB        Ectopic        Multiple        Live Births   1                  GynHx:  Menarche/Menopause: 10  Periods are regular every 28-30 days, lasting 4 days.  Dysmenorrhea: none.   Cyclic symptoms include none.    Social History     Substance and Sexual Activity   Sexual Activity Yes    Partners: Male    Birth control/protection: None     Current contraception: None  STIs: none    Substance:   Tobacco Use: Low Risk  (2024)    Patient History     Smoking Tobacco Use: Never     Smokeless Tobacco Use: Never     Passive Exposure: Not on file      Social History     Substance and Sexual Activity   Drug Use Never      Social History     Substance and Sexual Activity   Alcohol Use Yes    Comment: occasionally     Abuse: No  Depression Screen:   Negative    Past med hx and past surg hx reviewed and notable for: History of laparoscopy with probable salpingectomy for ectopic pregnancy    Assessment/Plan      Unremarkable annual GYN exam.  Patient sexually active with steady partner attempting pregnancy.  Declining STD testing.  Patient has same partner fathered first child and father pregnancy in 2022 which miscarried.  Patient is having regular cycles and intercourse 3 times per week.  Unable to achieve pregnancy since .  Discussed situation with patient.  Would refer for reproductive endocrinology.  With history of tubal pregnancy tubal factor probably is most likely at this point.  Patient will return to office in 1 year or as needed diet and exercise reviewed

## 2024-06-24 ENCOUNTER — APPOINTMENT (OUTPATIENT)
Dept: DERMATOLOGY | Facility: CLINIC | Age: 24
End: 2024-06-24
Payer: COMMERCIAL

## 2024-07-02 ENCOUNTER — APPOINTMENT (OUTPATIENT)
Dept: DERMATOLOGY | Facility: CLINIC | Age: 24
End: 2024-07-02
Payer: COMMERCIAL

## 2024-07-02 LAB
CYTOLOGY CMNT CVX/VAG CYTO-IMP: NORMAL
HPV HR 12 DNA GENITAL QL NAA+PROBE: POSITIVE
HPV HR GENOTYPES PNL CVX NAA+PROBE: POSITIVE
HPV16 DNA SPEC QL NAA+PROBE: NEGATIVE
HPV18 DNA SPEC QL NAA+PROBE: NEGATIVE
LAB AP HPV GENOTYPE QUESTION: YES
LAB AP HPV HR: NORMAL
LABORATORY COMMENT REPORT: NORMAL
LABORATORY COMMENT REPORT: NORMAL
LMP START DATE: NORMAL
PATH REPORT.TOTAL CANCER: NORMAL

## 2024-07-03 ENCOUNTER — OFFICE VISIT (OUTPATIENT)
Dept: DERMATOLOGY | Facility: CLINIC | Age: 24
End: 2024-07-03
Payer: COMMERCIAL

## 2024-07-03 ENCOUNTER — APPOINTMENT (OUTPATIENT)
Dept: DERMATOLOGY | Facility: CLINIC | Age: 24
End: 2024-07-03
Payer: COMMERCIAL

## 2024-07-03 DIAGNOSIS — B07.9 VIRAL WARTS, UNSPECIFIED TYPE: Primary | ICD-10-CM

## 2024-07-03 DIAGNOSIS — L70.0 ACNE VULGARIS: ICD-10-CM

## 2024-07-03 PROCEDURE — 17110 DESTRUCTION B9 LES UP TO 14: CPT | Performed by: STUDENT IN AN ORGANIZED HEALTH CARE EDUCATION/TRAINING PROGRAM

## 2024-07-03 PROCEDURE — 99213 OFFICE O/P EST LOW 20 MIN: CPT | Performed by: DERMATOLOGY

## 2024-07-03 RX ORDER — CLINDAMYCIN PHOSPHATE 10 UG/ML
LOTION TOPICAL 2 TIMES DAILY
Qty: 60 ML | Refills: 2 | Status: CANCELLED | OUTPATIENT
Start: 2024-07-03 | End: 2025-07-03

## 2024-07-03 RX ORDER — AZELAIC ACID 0.15 G/G
1 GEL TOPICAL 2 TIMES DAILY
Qty: 60 G | Refills: 11 | Status: CANCELLED | OUTPATIENT
Start: 2024-07-03 | End: 2025-07-03

## 2024-07-03 NOTE — PROGRESS NOTES
Subjective     Sugey Fonseca is a 23 y.o. female who presents for the following: Acne (Tried BPO wash, Tretinoin cream and Clindamycin lotion. Currently using Neutrogena face wash and moisturizer.  ) and Wart (On left middle toe - been there for about 1 year and tried OTC wart remover. ).     Last saw Dr. Roberson in 12/2023 via telemedicine, was on tazarotene 0.1% cream, BP 5% wash, clindamycin 1% lotion, and also started on spironolactone 25mg daily for 2 weeks then increase to 50mg daily if asymptomatic.    Patient states that she tried the spironolactone but did not notice a difference. Patient states that she is currently trying to get pregnant.     Also has a wart on her left 3rd toe that has been there for 1 year and has tried OTC wart remover. States that she wants treatment today.     Review of Systems:  No other skin or systemic complaints other than what is documented elsewhere in the note.    The following portions of the chart were reviewed this encounter and updated as appropriate:       Skin Cancer History  No skin cancer on file.    Specialty Problems          Dermatology Problems    Intrinsic eczema     Past Medical History:  Sugey Fonseca  has a past medical history of 39 weeks gestation of pregnancy ([facility]) (08/07/2019), Acute vaginitis (12/21/2018), Adjustment disorder with mixed anxiety and depressed mood (04/27/2017), Chlamydial infection, unspecified (07/30/2019), Myopia, bilateral (10/07/2014), Myopia, bilateral (11/05/2019), Pain in right shoulder (07/30/2020), Personal history of other diseases of the circulatory system (09/06/2019), Personal history of other diseases of the respiratory system (02/02/2021), Personal history of other infectious and parasitic diseases (01/07/2019), Presence of spectacles and contact lenses, Presence of spectacles and contact lenses, Puncture wound of abdominal wall without foreign body, periumbilic region without penetration into peritoneal cavity,  initial encounter (2021), Streptococcus, group b, as the cause of diseases classified elsewhere (2019), Supervision of pregnancy with insufficient  care, second trimester ([facility]) (2019), Unspecified astigmatism, right eye (10/07/2014), and Urinary tract infection, site not specified (2020).    Past Surgical History:  Sugey Fonseca  has a past surgical history that includes Other surgical history (2020) and Dilation and curettage of uterus (2023).    Family History:  Patient family history includes Diabetes in her maternal grandmother and another family member; Hypertension in her maternal grandmother.    Social History:  Sugey Fonseca  reports that she has never smoked. She has never used smokeless tobacco. She reports current alcohol use. She reports that she does not use drugs.    Allergies:  Patient has no known allergies.    Current Medications / CAM's:    Current Outpatient Medications:     cyclobenzaprine (Flexeril) 10 mg tablet, Take 1 tablet (10 mg) by mouth as needed at bedtime for muscle spasms., Disp: 30 tablet, Rfl: 0    ibuprofen 600 mg tablet, Take 1 tablet (600 mg) by mouth if needed., Disp: , Rfl:     prenatal vitamin (Classic Prenatal) 28 mg iron-800 mcg folic acid tablet tablet, Take 1 tablet by mouth once daily. (Patient not taking: Reported on 7/3/2024), Disp: 30 tablet, Rfl: 0    tretinoin (Retin-A) 0.05 % cream, Apply 1 Application topically once daily at bedtime., Disp: , Rfl:     triamcinolone (Kenalog) 0.1 % ointment, Apply 1 Application topically 2 times a day. For up to 2 weeks. (Patient not taking: Reported on 7/3/2024), Disp: 80 g, Rfl: 2     Objective   Well appearing patient in no apparent distress; mood and affect are within normal limits.    A focused examination was performed including the face, chest, back, and left foot. All findings within normal limits unless otherwise noted below.    Assessment/Plan   1. Viral warts, unspecified  type  Left 3rd Dorsal Mid Toe  On the patient's left 3rd dorsal mid toe, there is a flesh-colored, hyperkeratotic, verrucous papule.    Verruca Vulgaris - left 3rd dorsal mid toe.    - The viral nature of these warts and treatment options were discussed extensively with the patient today.  At this time, we recommend treatment with liquid nitrogen cryotherapy in the office today.    - RTC 1 month for cryotherapy     Destr of lesion - Left 3rd Dorsal Mid Toe  Complexity: simple    Destruction method: cryotherapy    Informed consent: discussed and consent obtained    Lesion destroyed using liquid nitrogen: Yes    Cryotherapy cycles:  2  Outcome: patient tolerated procedure well with no complications    Post-procedure details: wound care instructions given      2. Acne vulgaris  Head - Anterior (Face)  Scattered comedones and inflammatory papulopustules.    Acne Vulgaris - inflammatory type.    - The nature of this condition and treatment options were discussed extensively with the patient today.    - The patient states that she is trying to get pregnant at this time. Because of this we will not plan on starting any medications.  - If patient wishes to hold off on becoming pregnant, we will revisit this at a future visit. Of note, patient is having trouble getting pregnant currently due to possibly having damaged fallopian tubes from a previous miscarriage. Can consider starting medications if she restarts OCPs.    Related Procedures  Follow Up In Dermatology - Established Patient      Porfirio Sexton DO  PGY-4 Dermatology    I saw and evaluated the patient, participating in the key elements of the service.  I discussed the findings, assessment and plan with the resident and agree with resident’s findings and plan as documented in the resident's note.  I was immediately available for the entirety of the procedure(s) and present for the key and critical portions.     Duran Cleary MD PhD

## 2024-07-08 ENCOUNTER — TELEPHONE (OUTPATIENT)
Dept: OBSTETRICS AND GYNECOLOGY | Facility: CLINIC | Age: 24
End: 2024-07-08
Payer: COMMERCIAL

## 2024-07-08 NOTE — TELEPHONE ENCOUNTER
RN placed call to patient to discuss pap results.   Verified patient by name and .  Patient informed her pap results came back as normal, +HPV,   Patient educated on HPV and that  is a virus that is transmitted sexually and can cause cancer   Patient educated that it is recommended to have a repeat pap in 6 months  Patient scheduled on  2024  Pratibha Shin RN

## 2024-07-09 ENCOUNTER — APPOINTMENT (OUTPATIENT)
Dept: OBSTETRICS AND GYNECOLOGY | Facility: CLINIC | Age: 24
End: 2024-07-09
Payer: COMMERCIAL

## 2024-07-11 ENCOUNTER — APPOINTMENT (OUTPATIENT)
Dept: OBSTETRICS AND GYNECOLOGY | Facility: CLINIC | Age: 24
End: 2024-07-11
Payer: COMMERCIAL

## 2024-07-16 ENCOUNTER — TELEPHONE (OUTPATIENT)
Dept: OBSTETRICS AND GYNECOLOGY | Facility: CLINIC | Age: 24
End: 2024-07-16

## 2024-07-19 ENCOUNTER — APPOINTMENT (OUTPATIENT)
Dept: OBSTETRICS AND GYNECOLOGY | Facility: CLINIC | Age: 24
End: 2024-07-19
Payer: COMMERCIAL

## 2024-07-19 VITALS
WEIGHT: 139 LBS | BODY MASS INDEX: 19.9 KG/M2 | HEIGHT: 70 IN | SYSTOLIC BLOOD PRESSURE: 118 MMHG | DIASTOLIC BLOOD PRESSURE: 69 MMHG

## 2024-07-19 DIAGNOSIS — N97.9 FEMALE FERTILITY PROBLEM: Primary | ICD-10-CM

## 2024-07-19 PROCEDURE — 3008F BODY MASS INDEX DOCD: CPT | Performed by: OBSTETRICS & GYNECOLOGY

## 2024-07-19 PROCEDURE — 99213 OFFICE O/P EST LOW 20 MIN: CPT | Performed by: OBSTETRICS & GYNECOLOGY

## 2024-07-19 ASSESSMENT — PAIN SCALES - GENERAL: PAINLEVEL: 0-NO PAIN

## 2024-07-19 NOTE — PROGRESS NOTES
Subjective   Patient ID: Sugey Fonseca is a 23 y.o. female who presents for Fertitilty Issues (Pt is here for fertility issues./Last pap:  6/21/2024  neg/+hpv./Declines chaperone.   Roberta Aden LPN).  HPI    Review of Systems    Objective   Physical Exam    Assessment/Plan            Brian Conteh MD 07/19/24 1:22 PM

## 2024-07-22 NOTE — PROGRESS NOTES
patient is here to discuss fertility this is her second visit during last visit patient was asked to maintain record of basal body temperature  Patient did not keep any records of basal body temperature  We discussed the purpose of basal body temperature information that given his outpatient cycle she will start recording basal body temperature for next 3 months and then will return to the office for review over the graft and evaluation of next steps

## 2024-08-06 ENCOUNTER — TELEPHONE (OUTPATIENT)
Dept: OBSTETRICS AND GYNECOLOGY | Facility: CLINIC | Age: 24
End: 2024-08-06

## 2024-08-06 ENCOUNTER — APPOINTMENT (OUTPATIENT)
Dept: DERMATOLOGY | Facility: CLINIC | Age: 24
End: 2024-08-06
Payer: COMMERCIAL

## 2024-08-12 ENCOUNTER — APPOINTMENT (OUTPATIENT)
Dept: DERMATOLOGY | Facility: CLINIC | Age: 24
End: 2024-08-12
Payer: COMMERCIAL

## 2024-08-12 ENCOUNTER — LAB REQUISITION (OUTPATIENT)
Dept: LAB | Facility: HOSPITAL | Age: 24
End: 2024-08-12
Payer: COMMERCIAL

## 2024-08-12 DIAGNOSIS — N39.0 URINARY TRACT INFECTION, SITE NOT SPECIFIED: ICD-10-CM

## 2024-08-12 PROCEDURE — 87186 SC STD MICRODIL/AGAR DIL: CPT

## 2024-08-12 PROCEDURE — 87086 URINE CULTURE/COLONY COUNT: CPT

## 2024-08-15 ENCOUNTER — TELEPHONE (OUTPATIENT)
Dept: OBSTETRICS AND GYNECOLOGY | Facility: CLINIC | Age: 24
End: 2024-08-15
Payer: COMMERCIAL

## 2024-08-15 LAB — BACTERIA UR CULT: ABNORMAL

## 2024-08-16 NOTE — TELEPHONE ENCOUNTER
Dr Angel had previously reviewed results with Patient  RN attempted to call, voicemail is full  [employer] message sent  Pratibha Shin RN

## 2024-08-23 ENCOUNTER — CLINICAL SUPPORT (OUTPATIENT)
Dept: AUDIOLOGY | Facility: HOSPITAL | Age: 24
End: 2024-08-23
Payer: COMMERCIAL

## 2024-08-23 DIAGNOSIS — H90.3 SENSORINEURAL HEARING LOSS, BILATERAL: Primary | ICD-10-CM

## 2024-08-23 DIAGNOSIS — H93.13 SUBJECTIVE TINNITUS OF BOTH EARS: ICD-10-CM

## 2024-08-23 PROCEDURE — 92567 TYMPANOMETRY: CPT

## 2024-08-23 PROCEDURE — 92557 COMPREHENSIVE HEARING TEST: CPT

## 2024-08-23 NOTE — PATIENT INSTRUCTIONS
IMPRESSIONS     Mild to moderate sensorineural hearing loss in both ears.  Word understanding in quiet is excellent in both ears. Word understanding in noise is consistent with a moderate SNR loss in the right ear, and a moderate SNR loss in the left ear, and a mild SNR loss with both ears together.  Tympanometry indicates normal middle ear pressure and tympanic membrane mobility in both ears.     Today's test results are hearing loss requiring medical/otologic and audiologic follow-up.     Amplification needs: Binaural amplification is recommended due to significant hearing loss in both ears and reported related effect on patient's quality of life. Consider new technology as current devices are lost.    RECOMMENDATIONS     Continue medical follow up with primary care provider and/or Ears Nose and Throat (ENT) provider as recommended.  Consider amplification pending medical clearance. Contact insurance company to inquire about where is in network for hearing aids.  is not in network for Medicaid hearing aids. Patient was provided with the phone number for [employer] ([phone]), which is believed to be in network with patient's [place] Medicaid insurance. Patient was also provided with medical clearance form to be completed by a physician.   Discussed that loss and damage warranty has already been claimed for the left device, and the fee for replacing the right device is $416.00. Patient reported she was like to pursue insurance benefit information.  Avoid exposure to loud sounds by moving away from the noise, turning down the volume, or wearing proper hearing protection correctly.  Consider use of good communication strategies. These include but are not limited to the following: get Sugey's attention before speaking to her, close the distance between Sugey and who is speaking, limit background noise, allow Sugey access to visual cues (i.e. facial expressions/mouth movements, pictures, written instructions,  etc.). When in situations where background noise cannot be avoided, position yourself so that the background noise is to your back, and you communication partner is seated in front of you, ideally with a quiet area or wall behind them.   Consider use of tinnitus management options, which include but are not limited to the following: sound therapy (use of pleasant or calming sounds that diminish the presence of tinnitus); mindfulness, meditation, and breathing exercises; sleep hygiene; mental health evaluation and formal therapies; psychiatric management of psychopharmaceuticals; dental/orthodontia evaluation; dietary changes (reduction of sodium, caffeine, alcohol); lifestyle changes (exercise, etc.); use of hearing protection and avoidance of loud noise; and formal tinnitus therapies (Tinnitus Retraining Therapy/ TRT, Progressive Tinnitus Management, Tinnitus Activities Treatment, Cognitive Behavioral Therapy).

## 2024-08-23 NOTE — PROGRESS NOTES
AUDIOLOGY ADULT AUDIOMETRIC EVALUATION      Name:  Sugey Fonseca   :  2000  Age:  23 y.o.  Date of Evaluation:  2024    Time: 4512-7745    IMPRESSIONS     Mild to moderate sensorineural hearing loss in both ears.  Word understanding in quiet is excellent in both ears. Word understanding in noise is consistent with a moderate SNR loss in the right ear, and a moderate SNR loss in the left ear, and a mild SNR loss with both ears together.  Tympanometry indicates normal middle ear pressure and tympanic membrane mobility in both ears.     Today's test results are hearing loss requiring medical/otologic and audiologic follow-up.     Amplification needs: Binaural amplification is recommended due to significant hearing loss in both ears and reported related effect on patient's quality of life. Consider new technology as current devices are lost.    RECOMMENDATIONS     Continue medical follow up with primary care provider and/or Ears Nose and Throat (ENT) provider as recommended.  Consider amplification pending medical clearance. Contact insurance company to inquire about where is in network for hearing aids.  is not in network for Medicaid hearing aids. Patient was provided with the phone number for [email or web address] ([phone]), which is believed to be in network with patient's [place] Medicaid insurance. Patient was also provided with medical clearance form to be completed by a physician.   Discussed that loss and damage warranty has already been claimed for the left device, and the fee for replacing the right device is $416.00. Patient reported she was like to pursue insurance benefit information.  Avoid exposure to loud sounds by moving away from the noise, turning down the volume, or wearing proper hearing protection correctly.  Consider use of good communication strategies. These include but are not limited to the following: get Sugey's attention before speaking to her, close the distance between Sugey  "and who is speaking, limit background noise, allow Sugey access to visual cues (i.e. facial expressions/mouth movements, pictures, written instructions, etc.). When in situations where background noise cannot be avoided, position yourself so that the background noise is to your back, and you communication partner is seated in front of you, ideally with a quiet area or wall behind them.   Consider use of tinnitus management options, which include but are not limited to the following: sound therapy (use of pleasant or calming sounds that diminish the presence of tinnitus); mindfulness, meditation, and breathing exercises; sleep hygiene; mental health evaluation and formal therapies; psychiatric management of psychopharmaceuticals; dental/orthodontia evaluation; dietary changes (reduction of sodium, caffeine, alcohol); lifestyle changes (exercise, etc.); use of hearing protection and avoidance of loud noise; and formal tinnitus therapies (Tinnitus Retraining Therapy/ TRT, Progressive Tinnitus Management, Tinnitus Activities Treatment, Cognitive Behavioral Therapy).    HISTORY     Reason for visit:  Ms. Fonseca is seen today for a follow-up audiologic evaluation as both of her hearing aids are lost. History obtained from patient report and chart review.     Change in Hearing: denied; known bilateral mild to moderate sensorineural hearing loss  Difficult listening environments: Patient reports negative impact of hearing loss on her quality of life, and states having to say \"Huh?\" Frequently  Tinnitus: yes in both ears; intermittent, non-bothersome, non-pulsatile, and described as ringing sensation  Otalgia: denied  Aural Pressure/Fullness: denied  Recent Ear Infections/Otorrhea: denied  Hearing Aid Use: Patient was fit with binaural Phonak Sorbent Green B30-M hearing aids at [facility] on 3/8/2021; however, both devices are reported to be missing. When she was last seen in the audiology department on 1/9/2024 her " right hearing aid was reported to be missing. Recall, the loss and damage claim was used for the left hearing aid in January of 2022. Patient believes that her hearing aid may have been taken by her puppy.  Other Significant History: denied    Previous audiometric testing performed on 1/9/2024 revealed Today's test results revealed normal middle ear functioning with mild sloping to moderate sensorineural hearing loss in both ears. Her hearing remains stable. Sugey was counseled that in order to replace the right hearing aid through [employer]'s L&D claim, she will need to submit payment of $160. She noted that she would like to call to pay this over the phone once she is able to. She was provided with a copy of her hearing test and Audiology Scheduling line in order to complete this. She should also schedule the hearing aid re-fitting at that time as well. Sugey reported understanding.    Recall from previous encounter in the audiology department on 1/9/2024: Sugey Fonseca, 23 year old female, was seen today for an updated hearing evaluation. Sugey has known bilateral sensorineural hearing loss and was fit with hearing aids through  Audiology in March of 2021. Today she reported that she has lost the right hearing aid and would like to replace it. Sugey endorsed a slight decrease in hearing sensitivity in both ears. She noted occasional, bilateral tinnitus that is not bothersome. She denied dizziness, aural fullness, recent ear infections, otalgia, otorrhea, history of otologic surgeries or history of loud noise exposure.     Hearing aid information:   RIGHT: Phonak Bolero B30-M SN: [id number] with size 0 tubing and medium closed domes  LEFT: Phonak Bolero B30-M SN: [id number] with size 0 tubing and medium closed domes  Fitting date: 03/08/2021   Warranty expiration: 05/25/2026   L/D claim used on LEFT hearing aid Jan 2022.     EVALUATION AND PATIENT EDUCATION     The following is a brief interpretation of the  "obtained findings from the audiologic evaluation. Discussed results and recommendations with Ms. Fonseca. Questions were addressed and the patient was encouraged to contact our department at [phone] should concerns arise.      SUMMARY:  See scanned audiogram in \"Media.\"     TEST RESULTS     Otoscopic Evaluation:  Right Ear: Ear canal clear, tympanic membrane visualized.  Left Ear: Ear canal clear, tympanic membrane visualized.    Tympanometry (226 Hz): This test is an objective evaluation of middle ear function. CPT code: 07720   Right Ear: Type A, middle ear pressure and tympanic membrane compliance within normal limits.   Left Ear: Type A, middle ear pressure and tympanic membrane compliance within normal limits.     Acoustic Reflexes: This test is an objective measure of auditory and facial nerve pathways.   (Probe) Right Ear (ipsi right stimulus ear; contralateral left stimulus ear): Did not test.  (Probe) Left Ear (ipsi left stimulus ear; contralateral right stimulus ear):  Did not test.    Distortion Product Otoacoustic Emissions (DPOAE): This test is a measurement of responses which are generated by the cochlea when it is simultaneously stimulated by two pure tone frequencies. CPT code: 11537   Right Ear: Did not test.  Left Ear:  Did not test.  Present OAEs suggest normal or near cochlear outer hair cell function for corresponding frequency region(s). Absent OAEs with normal middle ear function can be consistent with some degree of hearing loss. Assessment of cochlear outer hair cell function may be impacted by outer or middle ear function.    Test technique: Conventional Audiometry via insert earphones. This test is an evaluation hearing sensitivity via air and bone conduction and speech recognition testing. CPT code: 59269   Reliability:  good. Note, patient was typing on her phone during testing, which may negatively impact some results, specifically speech in noise testing.     Pure Tone " Audiometry:    Right Ear: Hearing sensitivity within normal limits at 125 Hz, sloping to mild sensorineural hearing loss through 1000 Hz, to moderate for 1081-4164 Hz.  Left Ear: Hearing sensitivity within normal limits at 125 Hz, sloping to mild sensorineural hearing loss through 1000 Hz, to moderate for 4164-0406 Hz.    Speech Audiometry:   Right Ear: Speech Reception Threshold (SRT) was obtained at 30 dB HL. This is in fair agreement with three frequency Pure Tone Average.   Word Recognition scores were excellent (100%) in quiet when words were presented at 75 dB HL. These results are based on [facility] Auditory Test No.6 (NU-6) Ordered by difficulty (N=10).  Quick-SIN tested was administered at 75 dB HL, and testing revealed a signal to noise ratio (SNR) loss of 9.5, which is categorized as a moderate SNR loss (7-15 dB), and directional microphones help; consider array florencio.  Left Ear:  Speech Reception Threshold (SRT) was obtained at 30 dB HL. This is in fair agreement with three frequency Pure Tone Average.   Word Recognition scores were excellent (100%) in quiet when words were presented at 75 dB HL. These results are based on [facility] Auditory Test No.6 (NU-6) Ordered by difficulty (N=10).   Quick-SIN tested was administered at 75 dB HL, and testing revealed a signal to noise ratio (SNR) loss of 7.5, which is categorized as a moderate SNR loss (7-15 dB), and directional microphones help; consider array florencio.  Binaural:  Quick-SIN tested was administered at 75 dB HL, and testing revealed a signal to noise ratio (SNR) loss of 5.5, which is categorized as a mild SNR loss (3-7 dB), and patient may hear almost as well as those with normal hearing are able to hear in noise.    Comparison of today's results with previous test results: Stable since last evaluation on 1/9/2024.         HEBERT Wheeler, CCC-A  Licensed Clinical Audiologist    Degree of   Hearing Sensitivity dB Range    Within Normal Limits (WNL) 0 - 20   Slight 25   Mild 26 - 40   Moderate 41 - 55   Moderately-Severe 56 - 70   Severe 71 - 90   Profound 91 +     Key   CHL Conductive Hearing Loss   ECV Ear Canal Volume   HA Hearing Aid   NIHL Noise-Induced Hearing Loss   PTA Pure Tone Average   SNHL Sensorineural Hearing Loss   TM Tympanic Membrane   WNL Within Normal Limits

## 2024-08-28 ENCOUNTER — APPOINTMENT (OUTPATIENT)
Dept: AUDIOLOGY | Facility: HOSPITAL | Age: 24
End: 2024-08-28
Payer: COMMERCIAL

## 2024-09-04 ENCOUNTER — LAB REQUISITION (OUTPATIENT)
Dept: LAB | Facility: HOSPITAL | Age: 24
End: 2024-09-04
Payer: COMMERCIAL

## 2024-09-04 DIAGNOSIS — N39.0 URINARY TRACT INFECTION, SITE NOT SPECIFIED: ICD-10-CM

## 2024-09-04 PROCEDURE — 87086 URINE CULTURE/COLONY COUNT: CPT

## 2024-09-04 PROCEDURE — 87186 SC STD MICRODIL/AGAR DIL: CPT

## 2024-09-06 LAB — BACTERIA UR CULT: ABNORMAL

## 2024-09-26 ENCOUNTER — APPOINTMENT (OUTPATIENT)
Dept: UROLOGY | Facility: HOSPITAL | Age: 24
End: 2024-09-26
Payer: COMMERCIAL

## 2024-09-26 ENCOUNTER — OFFICE VISIT (OUTPATIENT)
Dept: UROLOGY | Facility: HOSPITAL | Age: 24
End: 2024-09-26
Payer: COMMERCIAL

## 2024-09-26 ENCOUNTER — APPOINTMENT (OUTPATIENT)
Dept: LAB | Facility: LAB | Age: 24
End: 2024-09-26
Payer: COMMERCIAL

## 2024-09-26 DIAGNOSIS — N39.0 RECURRENT UTI: Primary | ICD-10-CM

## 2024-09-26 LAB
POC APPEARANCE, URINE: CLEAR
POC BILIRUBIN, URINE: NEGATIVE
POC BLOOD, URINE: ABNORMAL
POC COLOR, URINE: YELLOW
POC GLUCOSE, URINE: NEGATIVE MG/DL
POC KETONES, URINE: NEGATIVE MG/DL
POC LEUKOCYTES, URINE: NEGATIVE
POC NITRITE,URINE: NEGATIVE
POC PH, URINE: 6 PH
POC PROTEIN, URINE: NEGATIVE MG/DL
POC SPECIFIC GRAVITY, URINE: >=1.03
POC UROBILINOGEN, URINE: 0.2 EU/DL

## 2024-09-26 PROCEDURE — 87086 URINE CULTURE/COLONY COUNT: CPT | Performed by: STUDENT IN AN ORGANIZED HEALTH CARE EDUCATION/TRAINING PROGRAM

## 2024-09-26 PROCEDURE — 99214 OFFICE O/P EST MOD 30 MIN: CPT | Performed by: STUDENT IN AN ORGANIZED HEALTH CARE EDUCATION/TRAINING PROGRAM

## 2024-09-26 PROCEDURE — 99204 OFFICE O/P NEW MOD 45 MIN: CPT | Performed by: STUDENT IN AN ORGANIZED HEALTH CARE EDUCATION/TRAINING PROGRAM

## 2024-09-26 PROCEDURE — 81003 URINALYSIS AUTO W/O SCOPE: CPT | Mod: QW | Performed by: STUDENT IN AN ORGANIZED HEALTH CARE EDUCATION/TRAINING PROGRAM

## 2024-09-26 RX ORDER — METHENAMINE MANDELATE 1000 MG/1
1 TABLET, FILM COATED ORAL 2 TIMES DAILY
Qty: 60 TABLET | Refills: 11 | Status: SHIPPED | OUTPATIENT
Start: 2024-09-26 | End: 2025-09-26

## 2024-09-26 NOTE — PROGRESS NOTES
Subjective   Patient ID: Sugey Fonseca is a 24 y.o. female    HPI  24 y.o. female who presents for recurrent UTIs, not post coital.  No hematuria  Last 6 months pt had 3 UTIS, non complicated.     The most recent Ucx, conducted on 9/04/24, revealed:  >100,000 Escherichia coli            Review of Systems    All systems were reviewed. Anything negative was noted in the HPI.    Objective   Physical Exam    General: Well developed, well nourished, alert and cooperative, appears in no acute distress   Eyes: Non-injected conjunctiva, sclera clear, no proptosis   Cardiac: Extremities are warm and well perfused. No edema, cyanosis or pallor   Lungs: Breathing is easy, non-labored. Speaking in clear and complete sentences. Normal diaphragmatic movement   MSK: Ambulatory with steady gait, unassisted   Neuro: Alert and oriented to person, place, and time   Psych: Demonstrates good judgment and reason, without hallucinations, abnormal affect or abnormal behaviors   Skin: No obvious lesions, no rashes       No CVA tenderness bilaterally   No suprapubic pain or discomfort       Past Medical History:   Diagnosis Date    39 weeks gestation of pregnancy ([facility]) 08/07/2019    39 weeks gestation of pregnancy    Acute vaginitis 12/21/2018    Bacterial vaginosis    Adjustment disorder with mixed anxiety and depressed mood 04/27/2017    Adjustment disorder with mixed anxiety and depressed mood    Chlamydial infection, unspecified 07/30/2019    Chlamydia infection    Myopia, bilateral 10/07/2014    Bilateral myopia    Myopia, bilateral 11/05/2019    Myopia of both eyes with astigmatism    Pain in right shoulder 07/30/2020    Right shoulder pain    Personal history of other diseases of the circulatory system 09/06/2019    History of hypertension    Personal history of other diseases of the respiratory system 02/02/2021    History of acute pharyngitis    Personal history of other infectious and parasitic diseases 01/07/2019    History  of sexually transmitted disease    Presence of spectacles and contact lenses     Wears glasses    Presence of spectacles and contact lenses     Wears contact lenses    Puncture wound of abdominal wall without foreign body, periumbilic region without penetration into peritoneal cavity, initial encounter 2021    Pierced belly button infection    Streptococcus, group b, as the cause of diseases classified elsewhere 2019    Positive GBS test    Supervision of pregnancy with insufficient  care, second trimester ([facility]) 2019    Late prenatal care in second trimester    Unspecified astigmatism, right eye 10/07/2014    Astigmatism of right eye    Urinary tract infection, site not specified 2020    Acute lower UTI         Past Surgical History:   Procedure Laterality Date    DILATION AND CURETTAGE OF UTERUS  2023    OTHER SURGICAL HISTORY  2020     section           Assessment/Plan   Recurrent UTIs    24 y.o. female who presents for the above condition.  we had a very long and extensive discussion with the patient regarding the pathophysiology, differential diagnosis, risk factor, associated condition, diagnostic work-up and management of BPH and lower urinary tract symptoms and recurrent UTIs. We had another discussion with the patient regarding lifestyle modifications including low fluid intake after 5 PM, timed voiding every 2 hours, and decrease caffeine intake     Plan:  Cysto  Renal US  U cx        2024    Scribe Attestation  By signing my name below, I, Haydee Bradley Scribe   attest that this documentation has been prepared under the direction and in the presence of Dr. Uri Pathak

## 2024-09-27 LAB — BACTERIA UR CULT: NO GROWTH

## 2024-10-03 ENCOUNTER — APPOINTMENT (OUTPATIENT)
Dept: DERMATOLOGY | Facility: CLINIC | Age: 24
End: 2024-10-03
Payer: COMMERCIAL

## 2024-10-04 ENCOUNTER — OFFICE VISIT (OUTPATIENT)
Dept: URGENT CARE | Age: 24
End: 2024-10-04
Payer: COMMERCIAL

## 2024-10-04 VITALS
OXYGEN SATURATION: 97 % | SYSTOLIC BLOOD PRESSURE: 88 MMHG | DIASTOLIC BLOOD PRESSURE: 60 MMHG | BODY MASS INDEX: 20.38 KG/M2 | RESPIRATION RATE: 18 BRPM | HEART RATE: 86 BPM | TEMPERATURE: 98.3 F | WEIGHT: 140 LBS

## 2024-10-04 DIAGNOSIS — N89.8 VAGINAL DISCHARGE: Primary | ICD-10-CM

## 2024-10-04 LAB
CHLAMYDIA TRACHOMATIS: NOT DETECTED
NEISSERIA GONORRHOEAE: NOT DETECTED
POC APPEARANCE, URINE: ABNORMAL
POC BILIRUBIN, URINE: NEGATIVE
POC BLOOD, URINE: NEGATIVE
POC COLOR, URINE: ABNORMAL
POC GLUCOSE, URINE: NEGATIVE MG/DL
POC KETONES, URINE: NEGATIVE MG/DL
POC LEUKOCYTES, URINE: NEGATIVE
POC NITRITE,URINE: NEGATIVE
POC PH, URINE: 6 PH
POC PROTEIN, URINE: NEGATIVE MG/DL
POC SPECIFIC GRAVITY, URINE: 1.02
POC UROBILINOGEN, URINE: 0.2 EU/DL
PREGNANCY TEST URINE, POC: NEGATIVE
TRICHOMONAS VAGINA: NOT DETECTED

## 2024-10-04 PROCEDURE — 87205 SMEAR GRAM STAIN: CPT

## 2024-10-04 NOTE — PROGRESS NOTES
Subjective   Patient ID: Sugey Fonseca is a 24 y.o. female. They present today with a chief complaint of Vaginal Discharge.    History of Present Illness  HPI  Pt is a 24 yr old female who presents with vaginal discharge and odor  Past Medical History  Allergies as of 10/04/2024    (Not on File)       (Not in a hospital admission)       Past Medical History:   Diagnosis Date    39 weeks gestation of pregnancy ([facility]) 2019    39 weeks gestation of pregnancy    Acute vaginitis 2018    Bacterial vaginosis    Adjustment disorder with mixed anxiety and depressed mood 2017    Adjustment disorder with mixed anxiety and depressed mood    Chlamydial infection, unspecified 2019    Chlamydia infection    Myopia, bilateral 10/07/2014    Bilateral myopia    Myopia, bilateral 2019    Myopia of both eyes with astigmatism    Pain in right shoulder 2020    Right shoulder pain    Personal history of other diseases of the circulatory system 2019    History of hypertension    Personal history of other diseases of the respiratory system 2021    History of acute pharyngitis    Personal history of other infectious and parasitic diseases 2019    History of sexually transmitted disease    Presence of spectacles and contact lenses     Wears glasses    Presence of spectacles and contact lenses     Wears contact lenses    Puncture wound of abdominal wall without foreign body, periumbilic region without penetration into peritoneal cavity, initial encounter 2021    Pierced belly button infection    Streptococcus, group b, as the cause of diseases classified elsewhere 2019    Positive GBS test    Supervision of pregnancy with insufficient  care, second trimester ([facility]) 2019    Late prenatal care in second trimester    Unspecified astigmatism, right eye 10/07/2014    Astigmatism of right eye    Urinary tract infection, site not specified 2020    Acute  lower UTI       Past Surgical History:   Procedure Laterality Date    DILATION AND CURETTAGE OF UTERUS  2023    OTHER SURGICAL HISTORY  2020     section        reports that she has never smoked. She has never used smokeless tobacco. She reports current alcohol use. She reports that she does not use drugs.    Review of Systems  Review of Systems  Gen: No fatigue, fever, sweats.  Head: No headache, trauma.  Eyes: No vision loss, double vision, drainage, eye pain.  ENT: No hearing changes, pain, epistaxis, congestion  Cardiac: No chest pain  Pulmonary: No shortness of breath,  pleuritic pain,   Heme/lymph: No swollen glands  GI: No abdominal pain, nausea, vomiting, diarrhea  : No  dysuria, frequency, urgency, hematuria, + vaginal discharge and odor  Musculoskeletal: No limb pain, joint pain, back pain, joint swelling or stiffness.  Skin: No rashes, pruritus, lumps, lesions.  Neuro: No Numbness, tingling, or weakness.  Psych: No  anxiety     Review of systems is otherwise negative unless stated above or in history of present illness.                             Objective    Vitals:    10/04/24 1559   BP: 88/60   Pulse: 86   Resp: 18   Temp: 36.8 °C (98.3 °F)   TempSrc: Oral   SpO2: 97%   Weight: 63.5 kg (140 lb)     No LMP recorded.    Physical Exam  General: Vital signs stable, Pt is alert, no acute distress  Eyes: Conjunctiva normal, PERRL, EOMs intact  HENMT: Normocephalic, atraumatic, external ears and nose normal, no scars or masses.  No mastoid tenderness. Trachea is midline. No meningeal signs, negative Kernig and Brudzinski, moves neck freely.  No sinus tenderness  Resp: Respiratory effort is normal, no retractions, no stridor. Lungs CTA, no wheezes or rhonchi  CV: Heart is regular rate and rhythm.   : vaginal exam chaproned showed very little discharge in vaginal area and no other abnormal findings  Skin: No evidence of trauma, skin is warm and dry. No rashes, lesions or ulcers.  Skel: full  range of motion of upper and lower extremities.   Neuro: Normal gait, CN II-XII intact, no motor or sensory changes.  Psych: Alert and oriented ×3, judgment is appropriate, normal mood and affect   Procedures    Point of Care Test & Imaging Results from this visit  No results found for this visit on 10/04/24.   No results found.    Diagnostic study results (if any) were reviewed by CATHERINE Hay.    Assessment/Plan   Allergies, medications, history, and pertinent labs/EKGs/Imaging reviewed by CATHERINE Hay.     Medical Decision Making  History and physical does not show any abnormal findings std testing neg will send out yeast and BV and call if positive    Orders and Diagnoses  There are no diagnoses linked to this encounter.    Medical Admin Record      Patient disposition: Home    Electronically signed by CATHERINE Hay  4:20 PM

## 2024-10-06 LAB
BACTERIAL VAGINOSIS VAG-IMP: ABNORMAL
CLUE CELLS VAG LPF-#/AREA: PRESENT /[LPF]
NUGENT SCORE: 4
YEAST VAG WET PREP-#/AREA: ABNORMAL

## 2024-10-07 ENCOUNTER — TELEPHONE (OUTPATIENT)
Dept: URGENT CARE | Age: 24
End: 2024-10-07

## 2024-10-07 DIAGNOSIS — B96.89 BACTERIAL VAGINOSIS: Primary | ICD-10-CM

## 2024-10-07 DIAGNOSIS — N76.0 BACTERIAL VAGINOSIS: Primary | ICD-10-CM

## 2024-10-07 RX ORDER — METRONIDAZOLE 500 MG/1
500 TABLET ORAL 2 TIMES DAILY
Qty: 14 TABLET | Refills: 0 | Status: SHIPPED | OUTPATIENT
Start: 2024-10-07 | End: 2024-10-14

## 2024-10-08 ENCOUNTER — OFFICE VISIT (OUTPATIENT)
Dept: DERMATOLOGY | Facility: CLINIC | Age: 24
End: 2024-10-08
Payer: COMMERCIAL

## 2024-10-08 DIAGNOSIS — L70.0 ACNE VULGARIS: Primary | ICD-10-CM

## 2024-10-08 DIAGNOSIS — B07.9 VIRAL WARTS, UNSPECIFIED TYPE: ICD-10-CM

## 2024-10-08 PROCEDURE — 1036F TOBACCO NON-USER: CPT

## 2024-10-08 PROCEDURE — 99213 OFFICE O/P EST LOW 20 MIN: CPT

## 2024-10-08 PROCEDURE — 17110 DESTRUCTION B9 LES UP TO 14: CPT

## 2024-10-08 RX ORDER — BENZOYL PEROXIDE 50 MG/ML
1 LIQUID TOPICAL DAILY
Qty: 227 G | Refills: 3 | Status: SHIPPED | OUTPATIENT
Start: 2024-10-08

## 2024-10-08 RX ORDER — TAZAROTENE 1 MG/G
CREAM TOPICAL DAILY
Qty: 60 G | Refills: 3 | Status: SHIPPED | OUTPATIENT
Start: 2024-10-08 | End: 2025-10-08

## 2024-10-08 RX ORDER — CLINDAMYCIN PHOSPHATE 10 UG/ML
LOTION TOPICAL DAILY
Qty: 60 ML | Refills: 3 | Status: SHIPPED | OUTPATIENT
Start: 2024-10-08 | End: 2025-10-08

## 2024-10-08 NOTE — PROGRESS NOTES
Subjective     Sugey Fonseca is a 24 y.o. female who presents for the following: Acne and Wart.     Acne on face. Currently using Neutrogena acne face wash. Last saw Dr. Cleary in 7/2024. At that time the patient was trying to get pregnant and therefore not started on any medications for her acne. Prior to that, she had seen Dr. Roberson in 12/2023 via telemedicine and was on BP wash 5%, clindamycin 1% lotion, tazarotene 0.1% cream, and started on spironolactone 25 mg daily for 2 weeks then increase to 50 daily if asymptomatic. She has also tried tretinoin in the past.     She states she did not notice any improvement on the spironolactone but felt that she had some improvement with the topical regimen in the past.     She is not currently pregnant or trying to become pregnant. She is currently getting the Depo shot.     She also has a wart on her left 3rd toe that has been treated with LN2 in the past.     Review of Systems:  No other skin or systemic complaints other than what is documented elsewhere in the note.    The following portions of the chart were reviewed this encounter and updated as appropriate:   Allergies         Skin Cancer History  No skin cancer on file.      Specialty Problems          Dermatology Problems    Intrinsic eczema        Objective   Well appearing patient in no apparent distress; mood and affect are within normal limits.    A focused skin examination was performed. All findings within normal limits unless otherwise noted below.    Assessment/Plan   1. Acne vulgaris  Head - Anterior (Face)  Scattered on the patient's face, there are multiple open and closed comedones; a few erythematous, inflammatory papules and pustules; and several pink to hyperpigmented macules consistent with post-inflammatory hyperpigmentation    Acne Vulgaris - Scattered on the face.  The nature of this condition and treatment options were discussed extensively with the patient today.  Given good response to  treatment in the past, I recommend combination topical therapy with Benzoyl Peroxide 5% creamy wash once daily in the morning; Clindamycin 1% lotion daily in the morning; and Tazarotene 0.1% cream at night.  The risks, benefits, and side effects of these medications, including the redness, dryness, and irritation expected with Tazarotene use, were discussed; I also informed the patient it will likely take at least 2-3 months to notice any significant improvement with the treatment regimen outlined above. [and she was instructed to discontinue use of these medications should she become or attempt to become pregnant at any time in the future].  The patient expressed understanding and is in agreement with this plan.  -Follow up in 3 months.     benzoyl peroxide 5 % external wash - Head - Anterior (Face)  Apply 1 Application topically once daily. In the morning    clindamycin (Cleocin T) 1 % lotion - Head - Anterior (Face)  Apply topically once daily. In the morning.    Related Medications  tazarotene (Tazorac) 0.1 % cream  Apply topically once daily. At bedtime.    2. Viral warts, unspecified type  Left 3rd Proximal Interphalangeal Joint of Toe  On the patient's left third toe, there is a flesh-colored, hyperkeratotic, verrucous papule.    -Discussed nature of condition  -Multiple treatments in office are often needed  -Diligent at home therapy is often needed. Recommended OTC Wart Stick. Handout provided at today's visit.   -Cryotherapy today  -Possible side effects of liquid nitrogen treatment reviewed including formation of blisters, crusting, tenderness, scar, and discoloration.  -Patient advised to return the office for re-evaluation if the treated lesion(s) do not resolve within 4-6 weeks. Patient verbalizes understanding.    Destr of lesion - Left 3rd Proximal Interphalangeal Joint of Toe  Complexity: simple    Destruction method: cryotherapy    Informed consent: discussed and consent obtained    Lesion  destroyed using liquid nitrogen: Yes    Outcome: patient tolerated procedure well with no complications    Post-procedure details: wound care instructions given          Recheck wart in 1 month   Recheck acne in 3 months

## 2024-10-09 ENCOUNTER — APPOINTMENT (OUTPATIENT)
Dept: DERMATOLOGY | Facility: CLINIC | Age: 24
End: 2024-10-09
Payer: COMMERCIAL

## 2024-10-09 ENCOUNTER — CONSULT (OUTPATIENT)
Dept: ENDOCRINOLOGY | Facility: CLINIC | Age: 24
End: 2024-10-09
Payer: COMMERCIAL

## 2024-10-09 ENCOUNTER — ANCILLARY PROCEDURE (OUTPATIENT)
Dept: ENDOCRINOLOGY | Facility: CLINIC | Age: 24
End: 2024-10-09
Payer: COMMERCIAL

## 2024-10-09 ENCOUNTER — HOSPITAL ENCOUNTER (OUTPATIENT)
Dept: RADIOLOGY | Facility: HOSPITAL | Age: 24
End: 2024-10-09
Payer: COMMERCIAL

## 2024-10-09 VITALS
SYSTOLIC BLOOD PRESSURE: 116 MMHG | HEIGHT: 69 IN | BODY MASS INDEX: 21.2 KG/M2 | WEIGHT: 143.13 LBS | DIASTOLIC BLOOD PRESSURE: 75 MMHG | HEART RATE: 102 BPM

## 2024-10-09 DIAGNOSIS — Z31.41 FERTILITY TESTING: ICD-10-CM

## 2024-10-09 DIAGNOSIS — Z01.83 ENCOUNTER FOR RH BLOOD TYPING: ICD-10-CM

## 2024-10-09 DIAGNOSIS — Z11.59 ENCOUNTER FOR SCREENING FOR OTHER VIRAL DISEASES: ICD-10-CM

## 2024-10-09 DIAGNOSIS — Z01.812 ENCOUNTER FOR PREPROCEDURAL LABORATORY EXAMINATION: ICD-10-CM

## 2024-10-09 DIAGNOSIS — Z13.29 SCREENING FOR THYROID DISORDER: Primary | ICD-10-CM

## 2024-10-09 DIAGNOSIS — Z13.29 SCREENING FOR THYROID DISORDER: ICD-10-CM

## 2024-10-09 DIAGNOSIS — Z11.3 SCREENING FOR STDS (SEXUALLY TRANSMITTED DISEASES): ICD-10-CM

## 2024-10-09 DIAGNOSIS — Z31.69 INFERTILITY COUNSELING: ICD-10-CM

## 2024-10-09 LAB
ABO GROUP (TYPE) IN BLOOD: NORMAL
ANTIBODY SCREEN: NORMAL
HBV SURFACE AG SERPL QL IA: NONREACTIVE
HCV AB SER QL: NONREACTIVE
HIV 1+2 AB+HIV1 P24 AG SERPL QL IA: NONREACTIVE
RH FACTOR (ANTIGEN D): NORMAL
TREPONEMA PALLIDUM IGG+IGM AB [PRESENCE] IN SERUM OR PLASMA BY IMMUNOASSAY: NONREACTIVE
TSH SERPL-ACNC: 0.84 MIU/L (ref 0.44–3.98)
VARICELLA ZOSTER IGG INDEX: 0.7 IA
VZV IGG SER QL IA: NEGATIVE

## 2024-10-09 PROCEDURE — 99214 OFFICE O/P EST MOD 30 MIN: CPT | Performed by: NURSE PRACTITIONER

## 2024-10-09 PROCEDURE — 86850 RBC ANTIBODY SCREEN: CPT

## 2024-10-09 PROCEDURE — 84443 ASSAY THYROID STIM HORMONE: CPT

## 2024-10-09 PROCEDURE — 86803 HEPATITIS C AB TEST: CPT

## 2024-10-09 PROCEDURE — 87389 HIV-1 AG W/HIV-1&-2 AB AG IA: CPT

## 2024-10-09 PROCEDURE — 83516 IMMUNOASSAY NONANTIBODY: CPT

## 2024-10-09 PROCEDURE — 87340 HEPATITIS B SURFACE AG IA: CPT

## 2024-10-09 PROCEDURE — 86780 TREPONEMA PALLIDUM: CPT

## 2024-10-09 PROCEDURE — 86787 VARICELLA-ZOSTER ANTIBODY: CPT

## 2024-10-09 PROCEDURE — 86901 BLOOD TYPING SEROLOGIC RH(D): CPT

## 2024-10-09 PROCEDURE — 86900 BLOOD TYPING SEROLOGIC ABO: CPT

## 2024-10-09 RX ORDER — DOXYCYCLINE 100 MG/1
CAPSULE ORAL
Qty: 10 CAPSULE | Refills: 0 | Status: SHIPPED | OUTPATIENT
Start: 2024-10-09

## 2024-10-09 ASSESSMENT — PATIENT HEALTH QUESTIONNAIRE - PHQ9
1. LITTLE INTEREST OR PLEASURE IN DOING THINGS: NOT AT ALL
2. FEELING DOWN, DEPRESSED OR HOPELESS: NOT AT ALL
SUM OF ALL RESPONSES TO PHQ9 QUESTIONS 1 AND 2: 0

## 2024-10-09 ASSESSMENT — LIFESTYLE VARIABLES
TOBACCO_USE: NO
HISTORY_ALCOHOL_USE: NO

## 2024-10-09 ASSESSMENT — PAIN SCALES - GENERAL: PAINLEVEL: 0-NO PAIN

## 2024-10-09 ASSESSMENT — COLUMBIA-SUICIDE SEVERITY RATING SCALE - C-SSRS
1. IN THE PAST MONTH, HAVE YOU WISHED YOU WERE DEAD OR WISHED YOU COULD GO TO SLEEP AND NOT WAKE UP?: NO
6. HAVE YOU EVER DONE ANYTHING, STARTED TO DO ANYTHING, OR PREPARED TO DO ANYTHING TO END YOUR LIFE?: NO
2. HAVE YOU ACTUALLY HAD ANY THOUGHTS OF KILLING YOURSELF?: NO

## 2024-10-09 NOTE — PROGRESS NOTES
Visit Type: In Person    NEW FERTILITY PATIENT VISIT    Referred by:    Accompanied today by:       Sugey Fonseca is a 24 y.o.  female who presents with   Not hetting pregnsnt.  Has been trying to conceive x 1.5 years.    Concerned since has not conceived since D&C 2 years ago- had SAB very early - 6weeks or less    Have you had any concerns about your fertility treatments so far? No     What are you goals for today's visit? Plans for prgnaxt.     What causes of infertility have been identified on your workup so far? I had a DNC two years ago and did not get pregnant after that.     Past Infertility Treatments: No      Please summarize your fertility treatments to date.   None    As far as you are aware, do you have insurance coverage for fertility diagnostic testing and/or fertility treatments? No      PRIOR EVALUATION / TREATMENT  None  Hysterosalpingogram: NA  Saline Infused Sonography: NA  GYN Pelvic Ultrasound: NA  Other:  NA  Prior Labs  Lab Results    Date Done      AMH: No results found for requested labs within last 1825 days. No results found for requested labs within last 1825 days.   TSH: 0.73 (Ref range: 0.44 - 3.98 mIU/L) 2021   PRL: No results found for requested labs within last 1825 days. No results found for requested labs within last 1825 days.   Testosterone: No results found for requested labs within last 1825 days. No results found for requested labs within last 1825 days.   DHEAS: No results found for requested labs within last 1825 days. No results found for requested labs within last 1825 days.   FSH: No results found for requested labs within last 1825 days. No results found for requested labs within last 1825 days.   17 OHP: No results found for requested labs within last 1825 days. No results found for requested labs within last 1825 days.   HgbA1c: 5.4 (Ref range: %) 2021   Hepatitis B surface antigen: No results found for requested labs within last 1825 days. No  results found for requested labs within last 1825 days.   Hepatitis C antibody: No results found for requested labs within last 1825 days. No results found for requested labs within last 1825 days.   HIV ½ Antigen Antibody screen with reflex: No results found for requested labs within last 1825 days. No results found for requested labs within last 1825 days.   Syphilis screening with reflex: No results found for requested labs within last 1825 days. No results found for requested labs within last 1825 days.   GC: Negative (Ref range: Negative) 2024   CT: Negative (Ref range: Negative) 2024   Type and Screen: O 12/10/2022   Rh: POS 12/10/2022   Antibody: No results found for requested labs within last 1825 days. No results found for requested labs within last 1825 days.   Rubella: POSITIVE (Ref range: ) 2022   Varicella: Equivocal (Ref range: NEGATIVE) 2022   Hemoglobin: No results found for requested labs within last 1825 days. No results found for requested labs within last 1825 days.   Hematocrit: No results found for requested labs within last 1825 days. No results found for requested labs within last 1825 days.   Creatinine: 0.76 (Ref range: 0.50 - 1.05 mg/dL) 12/10/2022   AST:13 (Ref range: 9 - 39 U/L) 12/10/2022   ALT:11 (Ref range: 7 - 45 U/L): 12/10/2022   Relationship Status:   Dating     Have you ever been pregnant? Yes    How many times have you been pregnant? 2    Have you ever had a miscarriage? Yes    How many times have you had a miscarriage? 1       OB Hx     OB History          2    Para   1    Term   1            AB   1    Living   1         SAB   1    IAB        Ectopic        Multiple        Live Births   1                 GYN HISTORY   Have you ever been diagnosed with a sexually transmitted disease? No  Yes CT    Please select all that are applicable:    Have you ever had Pelvic Inflammatory Disease? No    Have you had an abnormal PAP smear? No    Date & Result  of last PAP smear:   Lab Results   Component Value Date    FINALINTERP  06/21/2024         A. THINPREP PAP CERVIX, SCREENING -     Specimen Adequacy  Satisfactory for evaluation; endocervical/transformation zone component is present  Quality Indicator: Partially obscuring blood    General Categorization  Negative for intraepithelial lesion or malignancy.    Descriptive Interpretation  Negative for intraepithelial lesion or malignancy  Fungal organisms morphologically consistent with Candida spp.            Plan on repeat pap 12/2024    Have you ever had an abnormal Mammogram? No    Date & result of your last mammogram:  NA  Do you have pelvic pain? No    How many times per week do you have intercourse? 2    Do you have pain with intercourse? No    Do you use lubricants with intercourse? None.    Do you have pain with bowel movements? No              Do you have pain with a full bladder? No    MENSTRUAL HISTORY  LMP: Other  9/19/24    Menarche:   12  Contraception: None.  On depo and pills, have been off. Off depo in 2022.    Cycle length: 5  Q 30 days    Describe your bleeding: Average    Dysmenorrhea: No       ENDOCRINE/INFERTILITY HISTORY  Duration of infertility: 1-5 years    Coital Activity/week: 2    Nipple Discharge: No    Vision changes: No    Headaches: No    Excess hair growth: No    Excessive hair loss: No    Acne: Yes    Oily skin: No    Recent weight change  Weight gain: No    Weight loss: No    Exercise more than 3 times a week: No      PMH  Past Medical History:   Diagnosis Date    39 weeks gestation of pregnancy ([facility]) 08/07/2019    39 weeks gestation of pregnancy    Acute vaginitis 12/21/2018    Bacterial vaginosis    Adjustment disorder with mixed anxiety and depressed mood 04/27/2017    Adjustment disorder with mixed anxiety and depressed mood    Chlamydial infection, unspecified 07/30/2019    Chlamydia infection    Myopia, bilateral 10/07/2014    Bilateral myopia    Myopia, bilateral 11/05/2019     Myopia of both eyes with astigmatism    Pain in right shoulder 2020    Right shoulder pain    Personal history of other diseases of the circulatory system 2019    History of hypertension    Personal history of other diseases of the respiratory system 2021    History of acute pharyngitis    Personal history of other infectious and parasitic diseases 2019    History of sexually transmitted disease    Presence of spectacles and contact lenses     Wears glasses    Presence of spectacles and contact lenses     Wears contact lenses    Puncture wound of abdominal wall without foreign body, periumbilic region without penetration into peritoneal cavity, initial encounter 2021    Pierced belly button infection    Streptococcus, group b, as the cause of diseases classified elsewhere 2019    Positive GBS test    Supervision of pregnancy with insufficient  care, second trimester ([facility]) 2019    Late prenatal care in second trimester    Unspecified astigmatism, right eye 10/07/2014    Astigmatism of right eye    Urinary tract infection, site not specified 2020    Acute lower UTI        MEDICATIONS  Current Outpatient Medications on File Prior to Visit   Medication Sig Dispense Refill    benzoyl peroxide 5 % external wash Apply 1 Application topically once daily. In the morning 227 g 3    clindamycin (Cleocin T) 1 % lotion Apply topically once daily. In the morning. 60 mL 3    methenamine mandelate (Mandelamine) 1 gram tablet Take 1 tablet (1 g) by mouth 2 times a day. 60 tablet 11    metroNIDAZOLE (Flagyl) 500 mg tablet Take 1 tablet (500 mg) by mouth 2 times a day for 7 days. 14 tablet 0    prenatal vitamin (Classic Prenatal) 28 mg iron-800 mcg folic acid tablet tablet Take 1 tablet by mouth once daily. (Patient not taking: Reported on 7/3/2024) 30 tablet 0    tazarotene (Tazorac) 0.1 % cream Apply topically once daily. At bedtime. 60 g 3    tretinoin (Retin-A) 0.05 %  cream Apply 1 Application topically once daily at bedtime.       No current facility-administered medications on file prior to visit.        PSH  Past Surgical History:   Procedure Laterality Date     SECTION, CLASSIC  2019    DILATION AND CURETTAGE OF UTERUS  2023    OTHER SURGICAL HISTORY  2020     section        PSYCH HISTORY  Have you ever been diagnosed with a mental health Issue? No  Yes anxiety and depression  Have you ever been hospitalized for a mental health disorder? No       SOCIAL HISTORY  Social History     Tobacco Use    Smoking status: Never     Passive exposure: Never    Smokeless tobacco: Never   Vaping Use    Vaping status: Former    Substances: Nicotine, Flavoring    Devices: Disposable   Substance Use Topics    Alcohol use: Yes     Comment: occasionally    Drug use: Never     Occupation:    Have you ever been incarcerated? No    Do you have a history of domestic violence? No    Do you feel safe at home? Yes    Do you have a history of any negative sexual experience such as incest or rape? No       PARTNER HISTORY  Partner Name:   Did not wish to share. Different partner than first child. He does have to child.  Partner :    Partner email:    Occupation:    Prior fertility history:    PMH:    PSH:    Smoking:   Alcohol Use:    Drug Use:    Medications:    Injuries:    STD: No    Please select all that are applicable:    SA:    SA Results:      FAMILY HISTORY  Family History   Problem Relation Name Age of Onset    Diabetes Maternal Grandmother      Hypertension Maternal Grandmother      Diabetes Other Maternal Great Grandfath        CANCER HISTORY    Breast:   No  Ovarian: No    Colon: No    Endometrial: No      FAMILY VTE HISTORY  Family History of Blood Clots: No      GENETIC HISTORY  Ethnic Background  Patient:    Partner:    Genetic Disease in Family  Patient:  No  Partner:    Birth Defects in Family  Patient:  NO  Partner:    Genetic screening  "performed previously:   No     BMI:   BMI Readings from Last 1 Encounters:   10/09/24 21.14 kg/m²     VITALS:  /75   Pulse 102   Ht 1.753 m (5' 9\")   Wt 64.9 kg (143 lb 2 oz)   LMP 2024   BMI 21.14 kg/m²     ASSESSMENT   24 y.o.  female with  secondary infertility x 1.5 years, and the following pertinent medical issues: anxiety and deprression .  Partner SA: No Assessment    COUNSELING  We discussed causes of infertility including hormonal, egg quality issues, structural problems such as endometriosis, adhesions, or tubal problems, uterine factors such as polyps or fibroids, and sperm issues. Reviewed evaluation of such as well. We discussed various methods for achieving pregnancy in some detail including, ovulation induction, insemination, superovulation and IVF.    INSTRUCTIONS FOR INFERTILITY TESTING    Hysterosalpingogram (HSG or x-ray dye test)  An HSG is a test used to make sure your fallopian tubes are not blocked.  This test can only be done between cycle days 5-11, so it is important for you to call as soon as your period starts to schedule an appointment.  You should take ibuprofen 30 minutes before your appointment as this test can cause transient cramping.      If you are allergic to iodine or shellfish, please inform the . Please call [phone] to schedule this appointment.      PELVIC ULTRASOUNDS  Please call [phone] to schedule this appointment.      Continue to take a prenatal vitamin daily (800mcg or 0.8mg folic acid)    Please reach out to the office for a plan once all the testing is complete- I am not notified when everything on this list is complete, so please call the office directly to check in. The office number is [phone].    If you are interested in integrative medicine to support your care, please contact [facility] at [phone]. Services available include acupuncture, integrative medicine consultations, massage, " meditation and stress management. For acupuncture specifically, please ask for Kyara Nolan (east side) or Matilda Conteh (west side).     We also offer a virtual support group every Monday from 6pm-7pm. For more info please email:    For more information and to RSVP, email [email or web address]       If you have any questions and wish to review with a member of our team, please do CALL THE OFFICE during business hours. The office number is [phone].       Routine Testing  Fertility Center  STDs Within 1 year   Genetic carrier Waiver/Completed   T&S Within 1 year   AMH Within 1 year   TSH Within 1 year   Rubella/Varicella Within 5 years     PLAN  Orders Placed This Encounter   Procedures    Hysterosalpingogram (HSG)    US pelvis transvaginal    FL hysterosalpingogram    Antimullerian Hormone (Amh)    TSH with reflex to Free T4 if abnormal    Type And Screen    Varicella Zoster Antibody, Igg    Hepatitis B surface antigen    Hepatitis C Antibody    HIV 1/2 Antigen/Antibody Screen with Reflex to Confirmation    Syphilis Screen with Reflex    POCT pregnancy, urine manually resulted   Call cd 1 to schedule pelvic (cd 2, 3 or 4) and HSG (cd 5-11)    GENETIC SCREENING PATIENT  Waiver    PARTNER  NA  NA    FOLLOW UP   Consults:  NA  Chart to primary nurse for care coordination and patient check list/education  Enroll in Engaged MD  Take prenatal vitamins, vitamin D 2000 IUs daily  Discussed that pap and mammogram must be updated per ACOG guidelines before treatment can begin  Discussed that treatment cannot proceed until checklist items are complete   6 week follow up with STACIE  Additional testing for BMI < 18 or > 40: No  Sperm Donor:      MD Completion:  Ectopic Risk: Yes, hx of CT  Medically Complex: No    Fertility Plan Update:  Probable clomid 50mg with TI.    Roberta Banks  10/09/2024  10:30 AM

## 2024-10-12 LAB — MIS SERPL-MCNC: 14.38 NG/ML (ref 0.4–16.02)

## 2024-10-14 ENCOUNTER — APPOINTMENT (OUTPATIENT)
Dept: RADIOLOGY | Facility: CLINIC | Age: 24
End: 2024-10-14
Payer: COMMERCIAL

## 2024-10-14 DIAGNOSIS — N91.5 OLIGOMENORRHEA, UNSPECIFIED TYPE: Primary | ICD-10-CM

## 2024-10-14 DIAGNOSIS — Z13.1 SCREENING FOR DIABETES MELLITUS: ICD-10-CM

## 2024-10-21 ENCOUNTER — ANCILLARY PROCEDURE (OUTPATIENT)
Dept: ENDOCRINOLOGY | Facility: CLINIC | Age: 24
End: 2024-10-21
Payer: COMMERCIAL

## 2024-10-21 DIAGNOSIS — Z31.41 FERTILITY TESTING: ICD-10-CM

## 2024-10-21 PROCEDURE — 76830 TRANSVAGINAL US NON-OB: CPT

## 2024-10-21 PROCEDURE — 76830 TRANSVAGINAL US NON-OB: CPT | Performed by: OBSTETRICS & GYNECOLOGY

## 2024-10-28 ENCOUNTER — HOSPITAL ENCOUNTER (OUTPATIENT)
Dept: RADIOLOGY | Facility: HOSPITAL | Age: 24
Discharge: HOME | End: 2024-10-28
Payer: COMMERCIAL

## 2024-10-28 DIAGNOSIS — N39.0 RECURRENT UTI: ICD-10-CM

## 2024-10-28 PROCEDURE — 76770 US EXAM ABDO BACK WALL COMP: CPT | Performed by: RADIOLOGY

## 2024-10-28 PROCEDURE — 76770 US EXAM ABDO BACK WALL COMP: CPT

## 2024-10-29 ENCOUNTER — APPOINTMENT (OUTPATIENT)
Dept: RADIOLOGY | Facility: HOSPITAL | Age: 24
End: 2024-10-29
Payer: COMMERCIAL

## 2024-10-30 RX ORDER — TRETINOIN 1 MG/G
CREAM TOPICAL
Qty: 45 G | Refills: 10 | OUTPATIENT
Start: 2024-10-30

## 2024-11-06 ENCOUNTER — OFFICE VISIT (OUTPATIENT)
Dept: URGENT CARE | Age: 24
End: 2024-11-06
Payer: COMMERCIAL

## 2024-11-06 ENCOUNTER — PATIENT MESSAGE (OUTPATIENT)
Dept: UROLOGY | Facility: HOSPITAL | Age: 24
End: 2024-11-06

## 2024-11-06 VITALS
HEART RATE: 78 BPM | RESPIRATION RATE: 15 BRPM | DIASTOLIC BLOOD PRESSURE: 67 MMHG | TEMPERATURE: 98.3 F | SYSTOLIC BLOOD PRESSURE: 105 MMHG | WEIGHT: 135 LBS | OXYGEN SATURATION: 96 % | BODY MASS INDEX: 19.94 KG/M2

## 2024-11-06 DIAGNOSIS — R82.90 ABNORMAL URINE FINDING: ICD-10-CM

## 2024-11-06 DIAGNOSIS — N89.8 VAGINAL ITCHING: Primary | ICD-10-CM

## 2024-11-06 DIAGNOSIS — N89.8 VAGINAL DISCHARGE: ICD-10-CM

## 2024-11-06 LAB
POC APPEARANCE, URINE: CLEAR
POC BACTERIAL VAGINITIS (RAPID): POSITIVE
POC BILIRUBIN, URINE: NEGATIVE
POC BLOOD, URINE: NEGATIVE
POC COLOR, URINE: YELLOW
POC GLUCOSE, URINE: NEGATIVE MG/DL
POC KETONES, URINE: NEGATIVE MG/DL
POC LEUKOCYTES, URINE: ABNORMAL
POC NITRITE,URINE: NEGATIVE
POC PH, URINE: 6 PH
POC PROTEIN, URINE: NEGATIVE MG/DL
POC SPECIFIC GRAVITY, URINE: 1.02
POC TRICHOMONAS: NEGATIVE
POC UROBILINOGEN, URINE: 0.2 EU/DL
PREGNANCY TEST URINE, POC: NEGATIVE

## 2024-11-06 PROCEDURE — 87491 CHLMYD TRACH DNA AMP PROBE: CPT

## 2024-11-06 PROCEDURE — 87086 URINE CULTURE/COLONY COUNT: CPT

## 2024-11-06 PROCEDURE — 87591 N.GONORRHOEAE DNA AMP PROB: CPT

## 2024-11-06 RX ORDER — METRONIDAZOLE 7.5 MG/G
GEL VAGINAL NIGHTLY
Qty: 70 G | Refills: 0 | Status: SHIPPED | OUTPATIENT
Start: 2024-11-06 | End: 2024-11-11

## 2024-11-06 RX ORDER — FLUCONAZOLE 150 MG/1
150 TABLET ORAL ONCE
Qty: 2 TABLET | Refills: 0 | Status: SHIPPED | OUTPATIENT
Start: 2024-11-06 | End: 2024-11-06

## 2024-11-06 NOTE — PROGRESS NOTES
HPI:  Patient states that for the past week she had clumpy/white vaginal discharge with itching.  Pt states that she had yeast infection in the past and symptoms feel the same.  Pt denies AP or back pain.  No n/v/diarrhea.  No recent antibiotics.  No new partners.  Pt states that she has no urinary symptoms.      ROS:  No ap  No back pain  No urinary symptoms      PE:    A&O x3  NCAT  PERRLA, EOMI  RRR  CTAB  Abd:soft, nd, nt,+bs   no cva tndop  White clumpy cottage cheese like discharge w/o odor, no adnexal masses, no cervical motion tndop    MOEx4  No focal deficit  Judgement normal  No groin lymphadenopathy    Results:  BV+  Trich-  Clinically yeast    A/P:   Vaginitis  Abnormal urine findings    You likely have yeast infection in addition to the BV infection.  Increase fluids.  Eat yogurt and take probiotics.  Use unscented products.  We will call you with test results if you need further treatment.  Keep a diary of symptoms.  Recheck with your doctor if persists.  Go to the ER if starts getting worse.

## 2024-11-07 LAB
C TRACH RRNA SPEC QL NAA+PROBE: NEGATIVE
N GONORRHOEA DNA SPEC QL PROBE+SIG AMP: NEGATIVE

## 2024-11-08 LAB — BACTERIA UR CULT: NORMAL

## 2024-11-14 DIAGNOSIS — L70.0 ACNE VULGARIS: Primary | ICD-10-CM

## 2024-11-14 RX ORDER — TRETINOIN 0.5 MG/G
CREAM TOPICAL NIGHTLY
Qty: 45 G | Refills: 3 | Status: SHIPPED | OUTPATIENT
Start: 2024-11-14 | End: 2025-11-14

## 2024-11-17 DIAGNOSIS — L70.0 ACNE VULGARIS: ICD-10-CM

## 2024-11-22 ENCOUNTER — HOSPITAL ENCOUNTER (OUTPATIENT)
Dept: RADIOLOGY | Facility: CLINIC | Age: 24
End: 2024-11-22
Payer: COMMERCIAL

## 2024-11-22 RX ORDER — TRETINOIN 0.5 MG/G
CREAM TOPICAL NIGHTLY
Qty: 45 G | Refills: 3 | Status: SHIPPED | OUTPATIENT
Start: 2024-11-22 | End: 2025-11-22

## 2024-11-25 ENCOUNTER — TELEPHONE (OUTPATIENT)
Facility: CLINIC | Age: 24
End: 2024-11-25

## 2024-11-25 ENCOUNTER — APPOINTMENT (OUTPATIENT)
Facility: CLINIC | Age: 24
End: 2024-11-25
Payer: COMMERCIAL

## 2024-11-26 ENCOUNTER — HOSPITAL ENCOUNTER (OUTPATIENT)
Dept: RADIOLOGY | Facility: HOSPITAL | Age: 24
End: 2024-11-26
Payer: COMMERCIAL

## 2024-12-01 NOTE — PROGRESS NOTES
Subjective   Patient ID: Sugey Fonseca is a 24 y.o. female    HPI  24 y.o. female who presents for a follow-up visit with recurrent UTIs, not post coital. No hematuria Last 6 months pt had 3 UTIs, non complicated.   Today she reports burning and pain while urinating. She expresses being unsure if it is due to sexual activity. She reports she stopped having sexual intercourse just to see and the symptoms did not resolve. She reports having bacterial vaginosis previously and was on antibiotics.    The most recent Urine Culture, conducted on 11/06/2024, revealed:  No significant growth     The most recent renal US, conducted on 10/28/2024, revealed:  No hydronephrosis bilaterally.      Urinary bladder minimally distended limiting evaluation. Nonspecific  mild urinary bladder wall thickening may be exaggerated by the  underdistention. Cystitis not excluded      Review of Systems    All systems were reviewed. Anything negative was noted in the HPI.    Objective   Physical Exam    General: Well developed, well nourished, alert and cooperative, appears in no acute distress   Eyes: Non-injected conjunctiva, sclera clear, no proptosis   Cardiac: Extremities are warm and well perfused. No edema, cyanosis or pallor   Lungs: Breathing is easy, non-labored. Speaking in clear and complete sentences. Normal diaphragmatic movement   MSK: Ambulatory with steady gait, unassisted   Neuro: Alert and oriented to person, place, and time   Psych: Demonstrates good judgment and reason, without hallucinations, abnormal affect or abnormal behaviors   Skin: No obvious lesions, no rashes       No CVA tenderness bilaterally   No suprapubic pain or discomfort       Past Medical History:   Diagnosis Date    39 weeks gestation of pregnancy ([facility]) 08/07/2019    39 weeks gestation of pregnancy    Acute vaginitis 12/21/2018    Bacterial vaginosis    Adjustment disorder with mixed anxiety and depressed mood 04/27/2017    Adjustment disorder with  mixed anxiety and depressed mood    Chlamydial infection, unspecified 2019    Chlamydia infection    Myopia, bilateral 10/07/2014    Bilateral myopia    Myopia, bilateral 2019    Myopia of both eyes with astigmatism    Pain in right shoulder 2020    Right shoulder pain    Personal history of other diseases of the circulatory system 2019    History of hypertension    Personal history of other diseases of the respiratory system 2021    History of acute pharyngitis    Personal history of other infectious and parasitic diseases 2019    History of sexually transmitted disease    Presence of spectacles and contact lenses     Wears glasses    Presence of spectacles and contact lenses     Wears contact lenses    Puncture wound of abdominal wall without foreign body, periumbilic region without penetration into peritoneal cavity, initial encounter 2021    Pierced belly button infection    Streptococcus, group b, as the cause of diseases classified elsewhere 2019    Positive GBS test    Supervision of pregnancy with insufficient  care, second trimester ([facility]) 2019    Late prenatal care in second trimester    Unspecified astigmatism, right eye 10/07/2014    Astigmatism of right eye    Urinary tract infection, site not specified 2020    Acute lower UTI         Past Surgical History:   Procedure Laterality Date     SECTION, CLASSIC  2019    DILATION AND CURETTAGE OF UTERUS  2023    OTHER SURGICAL HISTORY  2020     section         Assessment/Plan   Recurrent UTIs    24 y.o. female who presents for the above condition.  we had a very long and extensive discussion with the patient regarding the pathophysiology, differential diagnosis, risk factor, associated condition, diagnostic work-up and management of BPH and lower urinary tract symptoms and recurrent UTIs. We had another discussion with the patient regarding lifestyle modifications  including low fluid intake after 5 PM, timed voiding every 2 hours, and decrease caffeine intake     Plan:  - Urine Culture today  - Follow-up for cystoscopy      E&M visit today is associated with current or anticipated ongoing medical care services related to a patient's single, serious condition or a complex condition.   12/2/2024    Scribe Attestation  By signing my name below, IAlex Scribe attest that this documentation has been prepared under the direction and in the presence of Dr. Uri Pathak.

## 2024-12-02 ENCOUNTER — OFFICE VISIT (OUTPATIENT)
Dept: UROLOGY | Facility: HOSPITAL | Age: 24
End: 2024-12-02
Payer: COMMERCIAL

## 2024-12-02 ENCOUNTER — APPOINTMENT (OUTPATIENT)
Facility: CLINIC | Age: 24
End: 2024-12-02
Payer: COMMERCIAL

## 2024-12-02 ENCOUNTER — APPOINTMENT (OUTPATIENT)
Dept: LAB | Facility: LAB | Age: 24
End: 2024-12-02
Payer: COMMERCIAL

## 2024-12-02 ENCOUNTER — HOSPITAL ENCOUNTER (OUTPATIENT)
Dept: RADIOLOGY | Facility: CLINIC | Age: 24
Discharge: HOME | End: 2024-12-02
Payer: COMMERCIAL

## 2024-12-02 VITALS — WEIGHT: 140 LBS | HEIGHT: 70 IN | BODY MASS INDEX: 20.04 KG/M2

## 2024-12-02 DIAGNOSIS — N39.0 RECURRENT UTI: Primary | ICD-10-CM

## 2024-12-02 DIAGNOSIS — M24.412 RECURRENT DISLOCATION, LEFT SHOULDER: ICD-10-CM

## 2024-12-02 DIAGNOSIS — M25.512 LEFT SHOULDER PAIN, UNSPECIFIED CHRONICITY: ICD-10-CM

## 2024-12-02 DIAGNOSIS — S43.432A LABRAL TEAR OF SHOULDER, LEFT, INITIAL ENCOUNTER: Primary | ICD-10-CM

## 2024-12-02 PROCEDURE — 99204 OFFICE O/P NEW MOD 45 MIN: CPT | Performed by: ORTHOPAEDIC SURGERY

## 2024-12-02 PROCEDURE — 73030 X-RAY EXAM OF SHOULDER: CPT | Mod: LT

## 2024-12-02 PROCEDURE — 99214 OFFICE O/P EST MOD 30 MIN: CPT | Performed by: STUDENT IN AN ORGANIZED HEALTH CARE EDUCATION/TRAINING PROGRAM

## 2024-12-02 PROCEDURE — 87086 URINE CULTURE/COLONY COUNT: CPT | Performed by: STUDENT IN AN ORGANIZED HEALTH CARE EDUCATION/TRAINING PROGRAM

## 2024-12-02 PROCEDURE — 1036F TOBACCO NON-USER: CPT | Performed by: STUDENT IN AN ORGANIZED HEALTH CARE EDUCATION/TRAINING PROGRAM

## 2024-12-02 PROCEDURE — 1036F TOBACCO NON-USER: CPT | Performed by: ORTHOPAEDIC SURGERY

## 2024-12-02 PROCEDURE — 73030 X-RAY EXAM OF SHOULDER: CPT | Mod: LEFT SIDE | Performed by: RADIOLOGY

## 2024-12-02 PROCEDURE — 3008F BODY MASS INDEX DOCD: CPT | Performed by: ORTHOPAEDIC SURGERY

## 2024-12-02 ASSESSMENT — PAIN SCALES - GENERAL: PAINLEVEL_OUTOF10: 8

## 2024-12-02 ASSESSMENT — PAIN - FUNCTIONAL ASSESSMENT: PAIN_FUNCTIONAL_ASSESSMENT: 0-10
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
## 2024-12-05 ENCOUNTER — TELEPHONE (OUTPATIENT)
Dept: UROLOGY | Facility: HOSPITAL | Age: 24
End: 2024-12-05
Payer: COMMERCIAL

## 2024-12-05 DIAGNOSIS — N39.0 RECURRENT UTI: Primary | ICD-10-CM

## 2024-12-05 LAB — BACTERIA UR CULT: ABNORMAL

## 2024-12-05 RX ORDER — CIPROFLOXACIN 500 MG/1
500 TABLET ORAL 2 TIMES DAILY
Qty: 8 TABLET | Refills: 0 | Status: SHIPPED | OUTPATIENT
Start: 2024-12-05 | End: 2024-12-09

## 2024-12-05 NOTE — TELEPHONE ENCOUNTER
----- Message from Uri Pathak sent at 12/5/2024 10:43 AM EST -----  Cipro 8 plz  ----- Message -----  From: Lab, Background User  Sent: 12/3/2024   7:12 PM EST  To: Uri Pathak MD MPH

## 2024-12-09 ENCOUNTER — APPOINTMENT (OUTPATIENT)
Dept: OBSTETRICS AND GYNECOLOGY | Facility: CLINIC | Age: 24
End: 2024-12-09
Payer: COMMERCIAL

## 2024-12-11 ENCOUNTER — APPOINTMENT (OUTPATIENT)
Dept: UROLOGY | Facility: HOSPITAL | Age: 24
End: 2024-12-11
Payer: COMMERCIAL

## 2025-01-02 ENCOUNTER — HOSPITAL ENCOUNTER (OUTPATIENT)
Dept: RADIOLOGY | Facility: HOSPITAL | Age: 25
Discharge: HOME | End: 2025-01-02
Payer: COMMERCIAL

## 2025-01-02 ENCOUNTER — ANCILLARY PROCEDURE (OUTPATIENT)
Dept: ENDOCRINOLOGY | Facility: CLINIC | Age: 25
End: 2025-01-02
Payer: COMMERCIAL

## 2025-01-02 DIAGNOSIS — Z31.41 FERTILITY TESTING: ICD-10-CM

## 2025-01-02 DIAGNOSIS — Z01.812 ENCOUNTER FOR PREPROCEDURAL LABORATORY EXAMINATION: Primary | ICD-10-CM

## 2025-01-02 LAB — PREGNANCY TEST URINE, POC: NEGATIVE

## 2025-01-02 PROCEDURE — 58340 CATHETER FOR HYSTEROGRAPHY: CPT

## 2025-01-02 PROCEDURE — 74740 X-RAY FEMALE GENITAL TRACT: CPT

## 2025-01-02 PROCEDURE — 2550000001 HC RX 255 CONTRASTS: Performed by: NURSE PRACTITIONER

## 2025-01-02 RX ADMIN — IOHEXOL 20 ML: 240 INJECTION, SOLUTION INTRATHECAL; INTRAVASCULAR; INTRAVENOUS; ORAL at 08:38

## 2025-01-02 NOTE — PROGRESS NOTES
Patient presents for an HSG, POCT UPT negative today, see [facility] Radiology schedule for notation. Jamilah Umana CNP 01/02/25 12:35 PM

## 2025-01-02 NOTE — PROCEDURES
Hysterosalpingogram (HSG)    Date/Time: 1/2/2025 12:41 PM    Performed by: CATHERINE Fernandez  Authorized by: CATHERINE Fernnadez    Consent:     Consent obtained:  Verbal and written    Consent given by:  Patient    Risks, benefits, and alternatives were discussed: yes      Risks discussed:  Bleeding, infection and pain  Universal protocol:     Procedure explained and questions answered to patient or proxy's satisfaction: yes      Relevant documents present and verified: yes      Test results available: yes      Imaging studies available: yes      Required blood products, implants, devices, and special equipment available: yes      Immediately prior to procedure, a time out was called: yes      Patient identity confirmed:  Verbally with patient, hospital-assigned identification number and arm band  Indications:     Indications:  Fertility testing  Pre-procedure details:     Skin preparation:  Povidone-iodine  Sedation:     Sedation type:  None  Anesthesia:     Anesthesia method:  None  Procedure specific details:      AYDEE Umana CNP performed this procedure      Hysterosalpingogram (HSG) risks, benefits, alternatives, and personnel discussed with patient who agreed to proceed.    Procedural time out        Done in room where procedure done: Yes        Done just before starting procedure: Yes                                                 All members of procedural team involved in time-out: Yes                  Active communication used: Yes                                                         All team members agreed on procedure: Yes                                        Patient correctly identified by two identifiers: Yes                                  Correct side and site identified: Yes                                                     All needed special equipment/instruments available: Yes               Prior to the start of the procedure a time out was taken and the following were verified: The  identity of the patient using two patient identifiers.   Urine pregnancy test was performed and was negative.   Risks, benefits, and alternatives of the procedure were explained to the patient.  Informed consent was obtained.     The patient was placed in the dorsal lithotomy position and a sterile speculum was placed in the vagina. The cervix was sterilized with Betadine x 3. The anterior lip of the cervix was grasped with a single-tooth tenaculum. The acorn cannula was then placed in the cervix. The patient was positioned and images were taken with fluoroscopy as dye was inserted through the cannula. All instruments were then removed. The patient tolerated the procedure well and was discharged home the same day without complications.    Uterus:  normal contour without filling defects  Tubes:  left tubal patency with free spill of dye, normal tubal architecture noted, and no loculations present. Right tubal patency with free spill of dye, normal tubal architecture noted, and some loculations present mid to distal.  Based on these findings, my recommendation is: No further follow up required. Dr. Dunn reviewed the images and agrees with the above findings.     The patient was counseled regarding the above findings and images were reviewed with patient at the time of the exam.     Jamilah Umana CNP 01/02/25 12:44 PM       Post-procedure details:     Procedure completion:  Tolerated well, no immediate complications

## 2025-01-10 ENCOUNTER — APPOINTMENT (OUTPATIENT)
Dept: DERMATOLOGY | Facility: CLINIC | Age: 25
End: 2025-01-10
Payer: COMMERCIAL

## 2025-01-24 ENCOUNTER — APPOINTMENT (OUTPATIENT)
Dept: DERMATOLOGY | Facility: CLINIC | Age: 25
End: 2025-01-24
Payer: COMMERCIAL

## 2025-01-29 ENCOUNTER — APPOINTMENT (OUTPATIENT)
Dept: DERMATOLOGY | Facility: CLINIC | Age: 25
End: 2025-01-29
Payer: COMMERCIAL

## 2025-01-31 ENCOUNTER — OFFICE VISIT (OUTPATIENT)
Dept: DERMATOLOGY | Facility: CLINIC | Age: 25
End: 2025-01-31
Payer: COMMERCIAL

## 2025-01-31 DIAGNOSIS — B07.9 VIRAL WARTS, UNSPECIFIED TYPE: ICD-10-CM

## 2025-01-31 DIAGNOSIS — L70.0 ACNE VULGARIS: Primary | ICD-10-CM

## 2025-01-31 PROCEDURE — 99213 OFFICE O/P EST LOW 20 MIN: CPT

## 2025-01-31 PROCEDURE — 17110 DESTRUCTION B9 LES UP TO 14: CPT

## 2025-01-31 RX ORDER — CLINDAMYCIN PHOSPHATE 10 UG/ML
LOTION TOPICAL DAILY
Qty: 60 ML | Refills: 3 | Status: SHIPPED | OUTPATIENT
Start: 2025-01-31 | End: 2026-01-31

## 2025-01-31 NOTE — PROGRESS NOTES
Subjective     Sugey Fonseca is a 24 y.o. female who presents for the following: Acne. Patient is here today for acne follow up. She is currently using BP 5% wash twice daily, Clindamycin 1% lotion daily in the morning, and Tretinoin 0.05% cream daily at bedtime. Reports a 50% improvement in acne. Patient states she is currently trying to get pregnant.     Also has a wart on her left 3rd toe that was treated with liquid nitrogen at her last visit on 10/8/2024. She would like to treat again today.       Review of Systems:  No other skin or systemic complaints other than what is documented elsewhere in the note.    The following portions of the chart were reviewed this encounter and updated as appropriate:   Allergies  Meds         Skin Cancer History  No skin cancer on file.      Specialty Problems          Dermatology Problems    Intrinsic eczema        Objective   Well appearing patient in no apparent distress; mood and affect are within normal limits.    A focused skin examination was performed. All findings within normal limits unless otherwise noted below.    Assessment/Plan   1. Acne vulgaris  Head - Anterior (Face)  Scattered comedones and fewer inflammatory papulopustules on the face.     Recommend:  -Given the patient is trying to get pregnant we will stop the Benzoyl Peroxide 5% wash and Tretinoin 0.05% cream.  - Can continue Clindamycin 1% lotion 1-2 times a day. Refills sent to pharmacy. Discussed with patient that topical Clindamycin is FDA Pregnancy Category B. We discussed that if she becomes pregnant, she should check with OBGYN to determine if she can continue using.         Related Medications  clindamycin (Cleocin T) 1 % lotion  Apply topically once daily.    2. Viral warts, unspecified type  Left 3rd Toe  Verrucous papule    -Discussed nature of condition  -Multiple treatments in office are often needed  -Diligent at home therapy is often needed  -Cryotherapy today  -Possible side effects of  liquid nitrogen treatment reviewed including formation of blisters, crusting, tenderness, scar, and discoloration which may be permanent.  -Patient advised to return the office for re-evaluation if the treated lesion(s) do not resolve within 4-6 weeks. Patient verbalizes understanding.    Destr of lesion - Left 3rd Toe  Complexity: simple    Destruction method: cryotherapy    Informed consent: discussed and consent obtained    Lesion destroyed using liquid nitrogen: Yes    Cryotherapy cycles:  2  Outcome: patient tolerated procedure well with no complications    Post-procedure details: wound care instructions given          Follow up in 4-6 weeks to re-evaluate wart on toe.

## 2025-02-13 ENCOUNTER — HOSPITAL ENCOUNTER (OUTPATIENT)
Dept: RADIOLOGY | Facility: HOSPITAL | Age: 25
End: 2025-02-13
Payer: COMMERCIAL

## 2025-03-04 ENCOUNTER — APPOINTMENT (OUTPATIENT)
Dept: DERMATOLOGY | Facility: CLINIC | Age: 25
End: 2025-03-04
Payer: COMMERCIAL

## 2025-03-06 ENCOUNTER — TELEMEDICINE (OUTPATIENT)
Dept: ENDOCRINOLOGY | Facility: CLINIC | Age: 25
End: 2025-03-06
Payer: COMMERCIAL

## 2025-03-06 VITALS — BODY MASS INDEX: 19.99 KG/M2 | HEIGHT: 69 IN | WEIGHT: 135 LBS

## 2025-03-06 DIAGNOSIS — N97.9 FEMALE INFERTILITY: ICD-10-CM

## 2025-03-06 DIAGNOSIS — N91.5 OLIGOMENORRHEA, UNSPECIFIED TYPE: Primary | ICD-10-CM

## 2025-03-06 DIAGNOSIS — Z13.1 SCREENING FOR DIABETES MELLITUS: ICD-10-CM

## 2025-03-06 PROCEDURE — 1036F TOBACCO NON-USER: CPT | Performed by: NURSE PRACTITIONER

## 2025-03-06 PROCEDURE — 3008F BODY MASS INDEX DOCD: CPT | Performed by: NURSE PRACTITIONER

## 2025-03-06 PROCEDURE — 99214 OFFICE O/P EST MOD 30 MIN: CPT | Performed by: NURSE PRACTITIONER

## 2025-03-06 ASSESSMENT — PATIENT HEALTH QUESTIONNAIRE - PHQ9
SUM OF ALL RESPONSES TO PHQ9 QUESTIONS 1 AND 2: 0
1. LITTLE INTEREST OR PLEASURE IN DOING THINGS: NOT AT ALL
2. FEELING DOWN, DEPRESSED OR HOPELESS: NOT AT ALL

## 2025-03-06 ASSESSMENT — PAIN SCALES - GENERAL: PAINLEVEL_OUTOF10: 0-NO PAIN

## 2025-03-06 NOTE — PROGRESS NOTES
Virtual or Telephone Consent: An interactive audio and video telecommunication system which permits real time communications between the patient (at the originating site) and provider (at the distant site) was utilized to provide this telehealth service  MD reviewed, No authorization to share with partner.    Follow Up Visit HPI    Patient is a 24 y.o.  female with  unexplained infertility  presenting today for follow up visit.   She has been tying to conceive since her D&C 2 years ago.    No partner eval wanted at this time.    Testing to date:   Result Date Done   TSH: 0.84 (Ref range: 0.44 - 3.98 mIU/L) 10/9/2024   AMH: 14.383 (Ref range: 0.401 - 16.015 ng/mL) 10/9/2024   PRL: No results found for requested labs within last 365 days. No results found for requested labs within last 365 days.   Testosterone: No results found for requested labs within last 365 days. No results found for requested labs within last 365 days.   DHEAS: No results found for requested labs within last 365 days. No results found for requested labs within last 365 days.   Other:     Hysterosalpingogram: [place] HYSTEROSALPINGOGRAM (2025): normal  Saline Infused Sonography: na  GYN Pelvic Ultrasound: US PELVIS TRANSVAGINAL (10/21/2024):   Anteverted uterus with an endometrial lining that measures as below. The myometrium demonstrates prominent vessels versus findings suggestive of adenomyosis. The  right ovary contains a small complex cyst that measures as seen below. The left ovary is normal in appearance.  Uterus  ======    Partner SA: No Assessment    Treatment to date:   None    Past Medical History:   Diagnosis Date    39 weeks gestation of pregnancy ([facility]) 2019    39 weeks gestation of pregnancy    Acne     Acute vaginitis 2018    Bacterial vaginosis    Adjustment disorder with mixed anxiety and depressed mood 2017    Adjustment disorder with mixed anxiety and depressed mood    Chlamydial infection,  unspecified 2019    Chlamydia infection    Myopia, bilateral 10/07/2014    Bilateral myopia    Myopia, bilateral 2019    Myopia of both eyes with astigmatism    Pain in right shoulder 2020    Right shoulder pain    Personal history of other diseases of the circulatory system 2019    History of hypertension    Personal history of other diseases of the respiratory system 2021    History of acute pharyngitis    Personal history of other infectious and parasitic diseases 2019    History of sexually transmitted disease    Presence of spectacles and contact lenses     Wears glasses    Presence of spectacles and contact lenses     Wears contact lenses    Puncture wound of abdominal wall without foreign body, periumbilic region without penetration into peritoneal cavity, initial encounter 2021    Pierced belly button infection    Streptococcus, group b, as the cause of diseases classified elsewhere 2019    Positive GBS test    Supervision of pregnancy with insufficient  care, second trimester ([facility]) 2019    Late prenatal care in second trimester    Unspecified astigmatism, right eye 10/07/2014    Astigmatism of right eye    Urinary tract infection, site not specified 2020    Acute lower UTI    Verruca      Past Surgical History:   Procedure Laterality Date     SECTION, CLASSIC  2019    DILATION AND CURETTAGE OF UTERUS  2023    OTHER SURGICAL HISTORY  2020     section     Current Outpatient Medications on File Prior to Visit   Medication Sig Dispense Refill    clindamycin (Cleocin T) 1 % lotion Apply topically once daily. 60 mL 3    doxycycline (Vibramycin) 100 mg capsule Start 1 day prior to your HSG. Take 1 tab BID po. Take with at least 8 ounces (large glass) of water, do not lie down for 30 minutes after 10 capsule 0    methenamine mandelate (Mandelamine) 1 gram tablet Take 1 tablet (1 g) by mouth 2 times a day. 60 tablet 11  "    No current facility-administered medications on file prior to visit.       BMI:   BMI Readings from Last 1 Encounters:   25 19.94 kg/m²     VITALS:  Ht 1.753 m (5' 9\")   Wt 61.2 kg (135 lb)   LMP 2025 (Approximate)   BMI 19.94 kg/m²   LMP: Patient's last menstrual period was 2025 (approximate).    ASSESSMENT   24 y.o.  female with secondary infertility x 2 years, Unexplained Infertility and the following pertinent medical issues: hx of CT, high AMH (no other symptoms of PCOS), anxiety, depression, rubella non immune .    COUNSELING    Reviewed non immune to varicella and implications.    Your varicella came back equivocal/or non-immune. We highly recommend getting a booster. It is a 2 part booster that is given 30 days a part. Pregnancy must be avoided for 30 days after each vaccine since it is a live vaccine. We recommend prior to achieving pregnancy since coming into contact with varicella or shingles can cause significant birth defects. You can get the injection at your PCP or many minute clinic. Please let me know if you have questions.    Reviewed getting more labs to assess for PCOS given high AMH      We discussed that Letrozole is used for ovulation induction and that recent studies have found letrozole is superior to Clomid for ovulation induction in patients with PCOS. We discussed common side effects of Letrozole including headaches, vision changes, hot flashes, mood changes, and breast tenderness.  Letrozole is NOT FDA approved for the treatment of infertility and was initially associated with (in some studies) a higher risk of congenital anomalies, although this was not found in a more recent large study of patients taking Letrozole for unexplained infertility. Patients must take a pregnancy test prior to starting Letrozole each month. We discussed that there is an increased risk of multiple gestation with Letrozole approximately 10% for twins and less than 1% for " triplets.  Counseled regarding option of selective reduction for higher order multiples.    Letrozole: 1 tablet(s) a day, cycle days 3-7   Note: 1st day of full flow is cycle day 1      Have sexual intercourse approximately every other day for 1 week beginning cycle day 11  You don't need temperature charts or LH predictor kits because normal cycle lengths indicate ovulatory cycles.  If you are planning inseminations, you will use LH predictor kits for timing    If normal 28 - 32 day cycle, repeat above for a total of 3 cycles    Call office if:   pregnant  cycles longer than 35 days   not pregnant after 3 regular cycles     Side effects of Letrozole may include:  acne  headache   hot flushes  leg cramps   nausea     If side effects are severe, alternative medications may be available. Letrozole has a 8-10% chance of twins and less than 1 % chance of triplets.     Progesterone:  May be given to bring on a period if you have not had a period after 40 days and a home pregnancy test is negative. Continue to take Progesterone if you have light bleeding. Expect to bleed within 2 weeks of finishing Progesterone    Letrozole is used for ovulation inductions. Letrozole is not a FDA approved medication for infertility treatment and may be associated with an increased for of congenital defects, although this was not found in a recent large study of patient taking letrozole for unexplained infertility. Letrozole is teratogenic therefore a urine pregnancy test should be taken prior to taking the medication.      Routine Testing  Fertility Center  STDs Within 1 year   Genetic carrier Waiver/Completed   T&S Within 1 year   AMH Within 1 year   TSH Within 1 year   Rubella/Varicella Within 5 years     BMI Testing [facility]  CBC Within 1 year   CMP Within 1 year   HgbA1c Within 1 year   Mag, Phos, Vit D <18 Within 1 year   MFM > 40  REQ   Wt loss consult > 40 OPT     PLAN  Orders Placed This Encounter   Procedures     Testosterone,Free and Total    Dhea-Sulfate    Hemoglobin A1C       FOLLOW UP   Consults:  None  Engaged MD  Take prenatal vitamins, vitamin D 2000 IUs daily  Discussed that treatment cannot proceed until checklist items are complete.   Additional testing for BMI < 18 or > 40: No.  Patient to schedule follow up visit if not pregnant after 3 letrozole cycles. Advised patient to arrange this now with the .  Chart to primary nurse for care coordination and patient check list/education.  Call cd 1 of cycle ready to go through. If not getting varicella vaccine can go through with next cycle. If planning to get the vaccine will have to hold off on treatment for 2 months given it is a live vacccine.    MD Completion:  Ectopic Risk: Yes hx of CT  Medically Complex: No  Outstanding boarding pass items: varicella vaccine if planning to get it.     Fertility Plan Update: Letrozole 2.5mg with TI x 3    Intimate Exam Performed: No, an intimate exam was not performed at this encounter.     Roberta Banks  03/06/2025  2:46 PM

## 2025-03-11 ENCOUNTER — APPOINTMENT (OUTPATIENT)
Dept: DERMATOLOGY | Facility: CLINIC | Age: 25
End: 2025-03-11
Payer: COMMERCIAL

## 2025-03-11 DIAGNOSIS — B07.9 VIRAL WARTS, UNSPECIFIED TYPE: ICD-10-CM

## 2025-03-11 DIAGNOSIS — L70.0 ACNE VULGARIS: Primary | ICD-10-CM

## 2025-03-11 DIAGNOSIS — L30.9 ECZEMA, UNSPECIFIED TYPE: ICD-10-CM

## 2025-03-11 PROCEDURE — 99214 OFFICE O/P EST MOD 30 MIN: CPT | Performed by: DERMATOLOGY

## 2025-03-11 RX ORDER — CLINDAMYCIN PHOSPHATE 10 UG/ML
LOTION TOPICAL
Qty: 60 ML | Refills: 11 | Status: SHIPPED | OUTPATIENT
Start: 2025-03-11

## 2025-03-11 RX ORDER — TRIAMCINOLONE ACETONIDE 1 MG/G
OINTMENT TOPICAL 2 TIMES DAILY
Qty: 80 G | Refills: 2 | Status: SHIPPED | OUTPATIENT
Start: 2025-03-11

## 2025-03-11 NOTE — PROGRESS NOTES
Subjective     Sugey Fonseca is a 24 y.o. female who presents for the following: Wart (Left toe ) and Eczema (On left arm and neck - currently using Vaseline ).     Patient last seen 1/31/24 by Trinh Lyon for acne and verruca vulgaris on left third toe. Patient is currently using clindamycin 1% lotion for her acne once to twice daily as she is actively attempting to conceive.      Regarding her verruca vulgaris, she has been treated 3 cycles of cryotherapy. Patient reports that lesion is becoming smaller, but she has developed lightening of her skin in the area that is being treated.       Review of Systems:  No other skin or systemic complaints other than what is documented elsewhere in the note.    The following portions of the chart were reviewed this encounter and updated as appropriate:          Skin Cancer History  No skin cancer on file.      Specialty Problems          Dermatology Problems    Intrinsic eczema        Objective   Well appearing patient in no apparent distress; mood and affect are within normal limits.    A focused skin examination was performed. All findings within normal limits unless otherwise noted below.    Assessment/Plan   1. Acne vulgaris  Head - Anterior (Face) (2)  Scattered comedones and fewer inflammatory papulopustules on the face.     Acne vulgaris - comedonal and inflammatory   - Continue Clindamycin 1% lotion 1-2 times a day. Refills sent to pharmacy. Discussed with patient that topical Clindamycin is FDA Pregnancy Category B. We discussed that if she becomes pregnant, she should check with OBGYN to determine if she can continue using.         Related Medications  clindamycin (Cleocin T) 1 % lotion  Apply topically twice daily to face    2. Viral warts, unspecified type  Left 3rd Toe  Hypopigmented papule with thrombosed capillaries on dermoscopy with surrounding rim of hypopigmentation     Verruca Vulgaris x 1 -   -Discussed nature of condition  -Counseled patient that  multiple treatments in office are often needed  -Discussed with patient that hypopigmentation of the skin surrounding the wart may not resolve.   -Possible side effects of liquid nitrogen treatment reviewed including formation of blisters, crusting, tenderness, scar, and discoloration which may be permanent.  -After discussion of the risks and benefits, patient would like to proceed with cryotherapy session #4 today.   -Recommended diligent at home salicylic acid therapy starting 1-2 weeks after treatment today. Home wart instructions handout provided.   -Patient advised to return the office for re-evaluation if the treated lesion(s) do not resolve within 4-6 weeks. Patient verbalizes understanding.    Destr of lesion - Left 3rd Toe  Complexity: simple    Destruction method: cryotherapy    Informed consent: discussed and consent obtained    Lesion destroyed using liquid nitrogen: Yes    Outcome: patient tolerated procedure well with no complications    Post-procedure details: wound care instructions given      Related Procedures  Follow Up In Dermatology - Established Patient    3. Eczema, unspecified type  Left Antecubital Fossa  On the patient's left antecubital fossa and right lateral neck, there are multiple erythematous, scaly papules coalescing into several ill-defined, slightly lichenified, thin plaques      Atopic dermatitis - left antecubital fossae and right neck   -Discussed nature of this condition and treatment options  -START triamcinolone 0.1% ointment BID x 2 weeks. Can repeat every 6-8 weeks. Discussed the risks of overuse including atrophy.    Related Medications  triamcinolone (Kenalog) 0.1 % ointment  Apply topically 2 times a day. To red itchy areas for up to 2 weeks as needed. Can repeat every 6-8 weeks.      RTC 1-2 months for repeat LN2 for verruca vulgaris.     Kalani Jacobson DO     I saw and evaluated the patient. I personally obtained the key and critical portions of the history and  physical exam or was physically present for key and critical portions performed by the resident/fellow. I reviewed the resident/fellow's documentation and discussed the patient with the resident/fellow. I agree with the resident/fellow's medical decision making as documented in the note.    Duran Cleary MD PhD

## 2025-03-12 LAB
DHEA-S SERPL-MCNC: 479 MCG/DL (ref 14–349)
EST. AVERAGE GLUCOSE BLD GHB EST-MCNC: 105 MG/DL
EST. AVERAGE GLUCOSE BLD GHB EST-SCNC: 5.8 MMOL/L
HBA1C MFR BLD: 5.3 % OF TOTAL HGB
TESTOST FREE SERPL-MCNC: NORMAL PG/ML
TESTOST SERPL-MCNC: NORMAL NG/DL

## 2025-03-18 LAB
DHEA-S SERPL-MCNC: 479 MCG/DL (ref 14–349)
EST. AVERAGE GLUCOSE BLD GHB EST-MCNC: 105 MG/DL
EST. AVERAGE GLUCOSE BLD GHB EST-SCNC: 5.8 MMOL/L
HBA1C MFR BLD: 5.3 % OF TOTAL HGB
TESTOST FREE SERPL-MCNC: 5.2 PG/ML (ref 0.1–6.4)
TESTOST SERPL-MCNC: 41 NG/DL (ref 2–45)

## 2025-03-20 ENCOUNTER — OFFICE VISIT (OUTPATIENT)
Dept: URGENT CARE | Age: 25
End: 2025-03-20
Payer: COMMERCIAL

## 2025-03-20 VITALS
HEART RATE: 85 BPM | WEIGHT: 140 LBS | RESPIRATION RATE: 16 BRPM | OXYGEN SATURATION: 95 % | SYSTOLIC BLOOD PRESSURE: 116 MMHG | BODY MASS INDEX: 20.67 KG/M2 | TEMPERATURE: 97.6 F | DIASTOLIC BLOOD PRESSURE: 71 MMHG

## 2025-03-20 DIAGNOSIS — N30.00 ACUTE CYSTITIS WITHOUT HEMATURIA: ICD-10-CM

## 2025-03-20 DIAGNOSIS — A60.04 HERPES SIMPLEX VULVOVAGINITIS: ICD-10-CM

## 2025-03-20 DIAGNOSIS — N89.8 VAGINAL DISCHARGE: Primary | ICD-10-CM

## 2025-03-20 DIAGNOSIS — R30.0 DYSURIA: ICD-10-CM

## 2025-03-20 DIAGNOSIS — N90.89 LABIAL LESION: ICD-10-CM

## 2025-03-20 DIAGNOSIS — B37.9 YEAST INFECTION: ICD-10-CM

## 2025-03-20 LAB
POC APPEARANCE, URINE: ABNORMAL
POC BILIRUBIN, URINE: NEGATIVE
POC BLOOD, URINE: ABNORMAL
POC COLOR, URINE: YELLOW
POC GLUCOSE, URINE: NEGATIVE MG/DL
POC KETONES, URINE: NEGATIVE MG/DL
POC LEUKOCYTES, URINE: ABNORMAL
POC NITRITE,URINE: NEGATIVE
POC PH, URINE: 6 PH
POC PROTEIN, URINE: NEGATIVE MG/DL
POC SPECIFIC GRAVITY, URINE: 1.02
POC UROBILINOGEN, URINE: 0.2 EU/DL
PREGNANCY TEST URINE, POC: NEGATIVE

## 2025-03-20 RX ORDER — NITROFURANTOIN 25; 75 MG/1; MG/1
100 CAPSULE ORAL 2 TIMES DAILY
Qty: 10 CAPSULE | Refills: 0 | Status: SHIPPED | OUTPATIENT
Start: 2025-03-20 | End: 2025-03-25

## 2025-03-20 ASSESSMENT — ENCOUNTER SYMPTOMS: DYSURIA: 1

## 2025-03-20 NOTE — PROGRESS NOTES
Subjective   Patient ID: Sugey Fonseca is a 24 y.o. female. They present today with a chief complaint of Female Dysuria.    History of Present Illness  Patient is a 24-year-old female with history of BV, chlamydia and hypertension who presents urgent care today with a complaint of vaginal discomfort and vaginal discharge.  She states she recently had STI testing and everything was negative.  She notes burning with urination but denies any abdominal pain, urinary frequency/urgency, fevers, chills, nausea, vomiting or flank pain.  No other complaints concerns mentioned this time.      History provided by:  Patient  Female Dysuria      Past Medical History  Allergies as of 03/20/2025    (No Known Allergies)       (Not in a hospital admission)         Past Medical History:   Diagnosis Date    39 weeks gestation of pregnancy ([facility]) 08/07/2019    39 weeks gestation of pregnancy    Acne     Acute vaginitis 12/21/2018    Bacterial vaginosis    Adjustment disorder with mixed anxiety and depressed mood 04/27/2017    Adjustment disorder with mixed anxiety and depressed mood    Chlamydial infection, unspecified 07/30/2019    Chlamydia infection    Myopia, bilateral 10/07/2014    Bilateral myopia    Myopia, bilateral 11/05/2019    Myopia of both eyes with astigmatism    Pain in right shoulder 07/30/2020    Right shoulder pain    Personal history of other diseases of the circulatory system 09/06/2019    History of hypertension    Personal history of other diseases of the respiratory system 02/02/2021    History of acute pharyngitis    Personal history of other infectious and parasitic diseases 01/07/2019    History of sexually transmitted disease    Presence of spectacles and contact lenses     Wears glasses    Presence of spectacles and contact lenses     Wears contact lenses    Puncture wound of abdominal wall without foreign body, periumbilic region without penetration into peritoneal cavity, initial encounter 02/02/2021     Pierced belly button infection    Streptococcus, group b, as the cause of diseases classified elsewhere 2019    Positive GBS test    Supervision of pregnancy with insufficient  care, second trimester ([facility]) 2019    Late prenatal care in second trimester    Unspecified astigmatism, right eye 10/07/2014    Astigmatism of right eye    Urinary tract infection, site not specified 2020    Acute lower UTI    Verruca        Past Surgical History:   Procedure Laterality Date     SECTION, CLASSIC  2019    DILATION AND CURETTAGE OF UTERUS  2023    OTHER SURGICAL HISTORY  2020     section        reports that she has never smoked. She has never been exposed to tobacco smoke. She has never used smokeless tobacco. She reports current alcohol use. She reports that she does not use drugs.    Review of Systems  Review of Systems   Genitourinary:  Positive for dysuria, genital sores and vaginal discharge.                                  Objective    Vitals:    25 1157   BP: 116/71   Pulse: 85   Resp: 16   Temp: 36.4 °C (97.6 °F)   SpO2: 95%   Weight: 63.5 kg (140 lb)     Patient's last menstrual period was 2025 (approximate).    Physical Exam  Vitals and nursing note reviewed. Exam conducted with a chaperone present.   Constitutional:       General: She is not in acute distress.     Appearance: Normal appearance. She is not ill-appearing, toxic-appearing or diaphoretic.   HENT:      Head: Normocephalic and atraumatic.      Mouth/Throat:      Mouth: Mucous membranes are moist.   Eyes:      Extraocular Movements: Extraocular movements intact.      Conjunctiva/sclera: Conjunctivae normal.      Pupils: Pupils are equal, round, and reactive to light.   Cardiovascular:      Rate and Rhythm: Normal rate and regular rhythm.      Pulses: Normal pulses.      Heart sounds: Normal heart sounds.   Pulmonary:      Effort: Pulmonary effort is normal. No respiratory distress.       Breath sounds: Normal breath sounds. No stridor. No wheezing, rhonchi or rales.   Chest:      Chest wall: No tenderness.   Abdominal:      Hernia: There is no hernia in the left inguinal area or right inguinal area.   Genitourinary:     General: Normal vulva.      Exam position: Lithotomy position.      Pubic Area: No rash or pubic lice.       Labia:         Right: Lesion present.         Left: Lesion present.       Urethra: No prolapse, urethral pain, urethral swelling or urethral lesion.      Vagina: Vaginal discharge present.   Musculoskeletal:         General: Normal range of motion.      Cervical back: Normal range of motion and neck supple.   Lymphadenopathy:      Lower Body: No right inguinal adenopathy. No left inguinal adenopathy.   Skin:     General: Skin is warm and dry.      Capillary Refill: Capillary refill takes less than 2 seconds.   Neurological:      General: No focal deficit present.      Mental Status: She is alert and oriented to person, place, and time.   Psychiatric:         Mood and Affect: Mood normal.         Behavior: Behavior normal.         Procedures      Assessment/Plan   Allergies, medications, history, and pertinent labs/EKGs/Imaging reviewed by CATHERINE Meeks.     Medical Decision Making    Patient is well appearing, afebrile, non toxic, not hypoxic, and appropriate for outpatient treatment and management at time of evaluation. Patient presents with dysuria, vaginal discharge and labial sores.     Differential includes but not limited to: UTI, BV, yeast infection, herpetic infection, other    An external genitalia exam was performed. BHAVIN Sanchez was present for exam.  Multiple erythematous lesions noted on labia bilaterally.  No active drainage.  No central clearing.  Exam suspicious for HSV infection.  Swab obtained and PCR ordered.  Patient declined STD testing.  There is also a scant amount of white vaginal drainage noted during exam.  Pregnancy is negative.   Urinalysis positive for 3+ leukocytes without nitrates or hematuria.  Patient provided with a prescription for Macrobid to be used as directed.  She understands PCR testing was ordered for BV and yeast and she will be notified of any positive results and started appropriate treatment at that time.  Recommended close follow-up with PCP.  Red flags and ER precautions discussed.  Patient voices understanding and is agreeable this plan.  She was discharged in stable condition.  All questions and concerns addressed.       Dictation software was used in the creation of this note which does not evaluate or correct for typographical, spelling, syntax or grammatical errors.    Orders and Diagnoses  Diagnoses and all orders for this visit:  Dysuria  -     POCT pregnancy, urine manually resulted  -     POCT UA Automated manually resulted      Medical Admin Record      Follow Up Instructions  No follow-ups on file.    Patient disposition: Home    Electronically signed by CATHERINE Meeks  12:16 PM

## 2025-03-20 NOTE — PATIENT INSTRUCTIONS
You were seen at Urgent Care today an diagnosed with urinary tract infection. Please treat as discussed. Please take medications as prescribed. Monitor for red flags which we spoke about, If your symptoms change, worsen or become concerning in any way, please go to the emergency room immediately, otherwise you can followup with your PCP in 2-3 days as needed

## 2025-03-21 LAB — BV SCORE VAG QL: NORMAL

## 2025-03-22 LAB
HSV1 DNA SPEC QL NAA+PROBE: NOT DETECTED
HSV2 DNA SPEC QL NAA+PROBE: DETECTED
SPECIMEN SOURCE: ABNORMAL

## 2025-03-23 LAB — BACTERIA UR CULT: ABNORMAL

## 2025-03-23 RX ORDER — FLUCONAZOLE 150 MG/1
150 TABLET ORAL ONCE
Qty: 1 TABLET | Refills: 0 | Status: SHIPPED | OUTPATIENT
Start: 2025-03-23 | End: 2025-03-23

## 2025-03-23 RX ORDER — VALACYCLOVIR HYDROCHLORIDE 1 G/1
1000 TABLET, FILM COATED ORAL 2 TIMES DAILY
Qty: 14 TABLET | Refills: 0 | Status: SHIPPED | OUTPATIENT
Start: 2025-03-23 | End: 2025-03-30

## 2025-03-23 NOTE — PROGRESS NOTES
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  Prescription for valacyclovir and Diflucan called into patient's pharmacy.  She was notified by telephone.  All questions and concerns addressed.

## 2025-03-27 ENCOUNTER — TELEPHONE (OUTPATIENT)
Dept: ORTHOPEDIC SURGERY | Facility: CLINIC | Age: 25
End: 2025-03-27
Payer: COMMERCIAL

## 2025-03-27 NOTE — TELEPHONE ENCOUNTER
Left message for Sugey to call the office. She has an appointment with  scheduled Monday 3/31/25 for her shoulder. Her previous appointment we ordered a MR arthrogram for the patient to complete then follow up regarding results. She has not completed this yet.

## 2025-03-31 ENCOUNTER — APPOINTMENT (OUTPATIENT)
Facility: CLINIC | Age: 25
End: 2025-03-31
Payer: COMMERCIAL

## 2025-03-31 ENCOUNTER — APPOINTMENT (OUTPATIENT)
Dept: OBSTETRICS AND GYNECOLOGY | Facility: CLINIC | Age: 25
End: 2025-03-31
Payer: COMMERCIAL

## 2025-04-02 ENCOUNTER — APPOINTMENT (OUTPATIENT)
Dept: OBSTETRICS AND GYNECOLOGY | Facility: CLINIC | Age: 25
End: 2025-04-02
Payer: COMMERCIAL

## 2025-04-09 ENCOUNTER — APPOINTMENT (OUTPATIENT)
Dept: LAB | Facility: HOSPITAL | Age: 25
End: 2025-04-09
Payer: COMMERCIAL

## 2025-04-09 ENCOUNTER — APPOINTMENT (OUTPATIENT)
Dept: OBSTETRICS AND GYNECOLOGY | Facility: CLINIC | Age: 25
End: 2025-04-09
Payer: COMMERCIAL

## 2025-04-09 VITALS — SYSTOLIC BLOOD PRESSURE: 110 MMHG | DIASTOLIC BLOOD PRESSURE: 68 MMHG | WEIGHT: 142 LBS | BODY MASS INDEX: 20.97 KG/M2

## 2025-04-09 DIAGNOSIS — N91.2 AMENORRHEA: Primary | ICD-10-CM

## 2025-04-09 LAB
ABO GROUP (TYPE) IN BLOOD: NORMAL
ANTIBODY SCREEN: NORMAL
RH FACTOR (ANTIGEN D): NORMAL

## 2025-04-09 PROCEDURE — 86900 BLOOD TYPING SEROLOGIC ABO: CPT

## 2025-04-09 PROCEDURE — 99214 OFFICE O/P EST MOD 30 MIN: CPT | Performed by: OBSTETRICS & GYNECOLOGY

## 2025-04-09 PROCEDURE — 84702 CHORIONIC GONADOTROPIN TEST: CPT

## 2025-04-09 PROCEDURE — 1036F TOBACCO NON-USER: CPT | Performed by: OBSTETRICS & GYNECOLOGY

## 2025-04-09 PROCEDURE — 86901 BLOOD TYPING SEROLOGIC RH(D): CPT

## 2025-04-09 PROCEDURE — 86850 RBC ANTIBODY SCREEN: CPT

## 2025-04-09 NOTE — PROGRESS NOTES
Sugey Fonseca is a 24 y.o. female who presents with a chief complaint of Consult (Patient complains she is having lower abdominal pain and breast tenderness, patient just took a positive pregnancy test at home.)       SUBJECTIVE  Patient presents complaining of lower abdominal pain and breast tenderness.  She had a positive home pregnancy test.  Her last menses was in February around the .  She has had 1 prior pregnancy.    Past Medical History:   Diagnosis Date    39 weeks gestation of pregnancy ([facility]) 2019    39 weeks gestation of pregnancy    Acne     Acute vaginitis 2018    Bacterial vaginosis    Adjustment disorder with mixed anxiety and depressed mood 2017    Adjustment disorder with mixed anxiety and depressed mood    Chlamydial infection, unspecified 2019    Chlamydia infection    Myopia, bilateral 10/07/2014    Bilateral myopia    Myopia, bilateral 2019    Myopia of both eyes with astigmatism    Pain in right shoulder 2020    Right shoulder pain    Personal history of other diseases of the circulatory system 2019    History of hypertension    Personal history of other diseases of the respiratory system 2021    History of acute pharyngitis    Personal history of other infectious and parasitic diseases 2019    History of sexually transmitted disease    Presence of spectacles and contact lenses     Wears glasses    Presence of spectacles and contact lenses     Wears contact lenses    Puncture wound of abdominal wall without foreign body, periumbilic region without penetration into peritoneal cavity, initial encounter 2021    Pierced belly button infection    Streptococcus, group b, as the cause of diseases classified elsewhere 2019    Positive GBS test    Supervision of pregnancy with insufficient  care, second trimester ([facility]) 2019    Late prenatal care in second trimester    Unspecified astigmatism, right eye  10/07/2014    Astigmatism of right eye    Urinary tract infection, site not specified 2020    Acute lower UTI    Verruca      Past Surgical History:   Procedure Laterality Date     SECTION, CLASSIC  2019    DILATION AND CURETTAGE OF UTERUS  2023    OTHER SURGICAL HISTORY  2020     section     Social History     Socioeconomic History    Marital status: Single   Tobacco Use    Smoking status: Never     Passive exposure: Never    Smokeless tobacco: Never   Vaping Use    Vaping status: Former    Substances: Nicotine, Flavoring    Devices: Disposable   Substance and Sexual Activity    Alcohol use: Yes     Comment: occasionally    Drug use: Never    Sexual activity: Yes     Partners: Male     Birth control/protection: None     Family History   Problem Relation Name Age of Onset    Diabetes Maternal Grandmother      Hypertension Maternal Grandmother      Diabetes Other Maternal Great Grandfath        OB History    Para Term  AB Living   2 1 1   1 1   SAB IAB Ectopic Multiple Live Births   1 0 0 0 1      # Outcome Date GA Lbr Dante/2nd Weight Sex Type Anes PTL Lv   2 2022           1 Term 10/09/19    F CS-[id number]   PRINCESS       OBJECTIVE  No Known Allergies   (Not in a hospital admission)       Review of Systems  History obtained from the patient  General ROS: negative  Psychological ROS: negative  Gastrointestinal ROS: no abdominal pain, change in bowel habits, or black or bloody stools  Musculoskeletal ROS: negative  Physical Exam  General Appearance: awake, alert, oriented, in no acute distress, well developed, well nourished, and in no acute distress  Skin: there are no suspicious lesions or rashes of concern, skin color, texture, turgor are normal; there are no bruises, rashes or lesions.  Head/Face: NCAT  Eyes: No gross abnormalities., PERRL, and EOMI  Neck: neck- supple, no mass, non-tender  Back: no pain to palpation  Abdomen: Soft, non-tender, normal bowel sounds; no  bruits, organomegaly or masses.  Extremities: Extremities warm to touch, pink, with no edema.  Musculoskeletal: negative    /68   Wt 64.4 kg (142 lb)   LMP 02/24/2025 (Exact Date)   BMI 20.97 kg/m²    Problem List Items Addressed This Visit    None  Visit Diagnoses       Amenorrhea    -  Primary    Relevant Orders    [facility] HCG, TOTAL, QN    Type And Screen Is this order related to pregnancy or an upcoming surgery? No

## 2025-04-10 ENCOUNTER — LAB REQUISITION (OUTPATIENT)
Dept: LAB | Facility: HOSPITAL | Age: 25
End: 2025-04-10

## 2025-04-10 ENCOUNTER — APPOINTMENT (OUTPATIENT)
Dept: LAB | Facility: HOSPITAL | Age: 25
End: 2025-04-10
Payer: COMMERCIAL

## 2025-04-10 DIAGNOSIS — N91.2 AMENORRHEA: Primary | ICD-10-CM

## 2025-04-10 DIAGNOSIS — N91.2 AMENORRHEA, UNSPECIFIED: ICD-10-CM

## 2025-04-10 DIAGNOSIS — N91.2 AMENORRHEA, UNSPECIFIED: Primary | ICD-10-CM

## 2025-04-10 LAB — B-HCG SERPL-ACNC: ABNORMAL MIU/ML

## 2025-04-14 ENCOUNTER — HOSPITAL ENCOUNTER (OUTPATIENT)
Dept: RADIOLOGY | Facility: CLINIC | Age: 25
Discharge: HOME | End: 2025-04-14
Payer: COMMERCIAL

## 2025-04-14 ENCOUNTER — APPOINTMENT (OUTPATIENT)
Facility: CLINIC | Age: 25
End: 2025-04-14
Payer: COMMERCIAL

## 2025-04-14 DIAGNOSIS — N91.2 AMENORRHEA: ICD-10-CM

## 2025-04-14 PROCEDURE — 76817 TRANSVAGINAL US OBSTETRIC: CPT

## 2025-04-14 PROCEDURE — 76801 OB US < 14 WKS SINGLE FETUS: CPT

## 2025-04-14 PROCEDURE — 76817 TRANSVAGINAL US OBSTETRIC: CPT | Performed by: OBSTETRICS & GYNECOLOGY

## 2025-04-14 PROCEDURE — 76801 OB US < 14 WKS SINGLE FETUS: CPT | Performed by: OBSTETRICS & GYNECOLOGY

## 2025-04-16 ENCOUNTER — APPOINTMENT (OUTPATIENT)
Dept: OBSTETRICS AND GYNECOLOGY | Facility: CLINIC | Age: 25
End: 2025-04-16
Payer: COMMERCIAL

## 2025-04-17 ENCOUNTER — TELEPHONE (OUTPATIENT)
Facility: CLINIC | Age: 25
End: 2025-04-17
Payer: COMMERCIAL

## 2025-04-17 NOTE — TELEPHONE ENCOUNTER
Spoke with patient and explained this is normal. Patient understood and felt much better. She did say it has almost stopped.    Hermila Salomon,LISA

## 2025-04-17 NOTE — TELEPHONE ENCOUNTER
PT having slight spotting for the past 2 days. It is browish in color. She is 8 weeks pregnant. Please advise.    Madhavi Olguin MA

## 2025-04-21 ENCOUNTER — APPOINTMENT (OUTPATIENT)
Dept: RADIOLOGY | Facility: HOSPITAL | Age: 25
End: 2025-04-21
Payer: COMMERCIAL

## 2025-04-21 ENCOUNTER — APPOINTMENT (OUTPATIENT)
Facility: CLINIC | Age: 25
End: 2025-04-21
Payer: COMMERCIAL

## 2025-04-23 NOTE — PROGRESS NOTES
[facility] Oral TIC/IUI    Age: 24 y.o.    Provider: Roberta Banks CNP  Primary Nurse: Julia  Reasons for Treatment: Unexplained Infertility  Last BMI  25 : 20.97 kg/m²       Medical History[1]    Date Done Consultation Results/Comments   2025 Medication Protocol   Fertility Plan Update: Letrozole 2.5mg with TI x 3    N/A Authorization to Share []       Procedure Order Placed []     Date Done Female Labs Results/Comments   2025 T&S (Q 1 Year) ABO: O  Rh: POS  Antibody: NEG     10/9/2024 Hep B sAg Nonreactive   10/9/2024 Hep C AB Nonreactive   10/9/2024 HIV Nonreactive   10/9/2024 Syphilis Nonreactive   2024 GC/CT GC: Negative  CT: Negative   2022 Rubella (Q 5 Years) POSITIVE   10/9/2024 Varicella (Q 5 Years) Negative   *** Carrier Screening Myriad 2bP: ***  {CRUZ Carrier Screenin}  {CRUZ Carrier Screening Neg/Pos:75505}   ***  GYN Waiver []      Date Done Male Labs (required if IUI)   Results/Comments  N/a    Hep B sAg     Hep C AB      HIV     Syphilis     GC/CT GC:   CT:     Carrier Screening      N/A    Semen Analysis  Volume(mL):   Count(million):   Motility(%):   Motile Count(million):      MD Completion:  Ectopic Risk: Yes hx of CT   Medically Complex: No       [1]   Past Medical History:  Diagnosis Date    39 weeks gestation of pregnancy ([facility]) 2019    39 weeks gestation of pregnancy    Acne     Acute vaginitis 2018    Bacterial vaginosis    Adjustment disorder with mixed anxiety and depressed mood 2017    Adjustment disorder with mixed anxiety and depressed mood    Chlamydial infection, unspecified 2019    Chlamydia infection    Myopia, bilateral 10/07/2014    Bilateral myopia    Myopia, bilateral 2019    Myopia of both eyes with astigmatism    Pain in right shoulder 2020    Right shoulder pain    Personal history of other diseases of the circulatory system 2019    History of hypertension    Personal history of other  diseases of the respiratory system 2021    History of acute pharyngitis    Personal history of other infectious and parasitic diseases 2019    History of sexually transmitted disease    Presence of spectacles and contact lenses     Wears glasses    Presence of spectacles and contact lenses     Wears contact lenses    Puncture wound of abdominal wall without foreign body, periumbilic region without penetration into peritoneal cavity, initial encounter 2021    Pierced belly button infection    Streptococcus, group b, as the cause of diseases classified elsewhere 2019    Positive GBS test    Supervision of pregnancy with insufficient  care, second trimester ([facility]) 2019    Late prenatal care in second trimester    Unspecified astigmatism, right eye 10/07/2014    Astigmatism of right eye    Urinary tract infection, site not specified 2020    Acute lower UTI    Verruca

## 2025-04-26 ENCOUNTER — PATIENT MESSAGE (OUTPATIENT)
Dept: OBSTETRICS AND GYNECOLOGY | Facility: CLINIC | Age: 25
End: 2025-04-26
Payer: COMMERCIAL

## 2025-04-26 DIAGNOSIS — N91.2 AMENORRHEA: Primary | ICD-10-CM

## 2025-04-28 ENCOUNTER — APPOINTMENT (OUTPATIENT)
Dept: OBSTETRICS AND GYNECOLOGY | Facility: CLINIC | Age: 25
End: 2025-04-28
Payer: COMMERCIAL

## 2025-04-28 ENCOUNTER — OFFICE VISIT (OUTPATIENT)
Facility: CLINIC | Age: 25
End: 2025-04-28
Payer: COMMERCIAL

## 2025-04-28 VITALS — WEIGHT: 142 LBS | HEIGHT: 69 IN | BODY MASS INDEX: 21.03 KG/M2

## 2025-04-28 DIAGNOSIS — M24.412 RECURRENT DISLOCATION, LEFT SHOULDER: ICD-10-CM

## 2025-04-28 PROCEDURE — 3008F BODY MASS INDEX DOCD: CPT

## 2025-04-28 PROCEDURE — 1036F TOBACCO NON-USER: CPT

## 2025-04-28 PROCEDURE — 99213 OFFICE O/P EST LOW 20 MIN: CPT

## 2025-04-28 ASSESSMENT — PAIN - FUNCTIONAL ASSESSMENT: PAIN_FUNCTIONAL_ASSESSMENT: NO/DENIES PAIN

## 2025-04-28 NOTE — PROGRESS NOTES
History of Present Illness:    24 y.o. female presents to clinic for her left shoulder. Patient was previously seen by Dr. Burrell on 1/22/24. She has a left shoulder dislocation aproximately 5 years ago. She continues to have recurrent instability in the left shoulder. She feels it go in and out of place. Occasionally she has to self reduce the shoulder She completed physical therapy in 2023 but continues to have reccurent episodes of subluxations. She has pain with activity and it better with rest. Dr. Burrell recommended moving forward with a MR arthrogram to evaluate for labral tearing but the patient has not had it completed.  Patient reports that she just found out that she is 8 weeks pregnant and would not like to move forward with an MR arthrogram at this time.  Patient is not interested in pursuing surgical intervention until she has delivered and recovered from her pregnancy.  Patient would like to discuss other treatment options today for her continued instability of the left shoulder.        Review of Systems:    GENERAL: Negative  GI: Negative  MUSCULOSKELETAL: See HPI  SKIN: Negative  NEURO:  Negative     Physical Exam:    Shoulder:  Examination of left shoulder demonstrates no tenderness to palpation to the sternoclavicular joint. no tenderness to the AC Joint. Range of motion is 160 degrees of flexion abduction, 80 degrees of external rotation, and scapula of internal rotation. positve Biceps ,   Elbow and wrist motion were not irritable.  Radial pulse 2+ and palpable. SILT. UE is NVI. Positive apprehension positive relocation positive surprise. Neurovasc intact left upper extremity      Imaging:   No new imaging obtained today    Previous X-rays were reviewed that demonstrated  anterior subluxation of the left shoulder     Assessment:   Left shoulder pain related to history of anterior subluxation with probable labral tear.    Plan:  Discussed further management with the patient today.  Patient has a  history of 5 years of recurrent instability of the left shoulder.  Patient recently just found out that she is 8 weeks pregnant and therefore would like to hold off on moving forward with an MR arthrogram of the left shoulder to evaluate for labral tearing.  She plans on treating this conservatively.  Patient was given a prescription for physical therapy today to work on range of motion and strengthening with scapular stabilizing muscles.  She can continue to take Tylenol as needed for pain.  Patient states that she will follow-up with us once she has delivered and has recovered from her that time would like to consider moving forward with an MR arthrogram of the left shoulder to evaluate for back as needed.

## 2025-04-30 ENCOUNTER — OFFICE VISIT (OUTPATIENT)
Dept: URGENT CARE | Age: 25
End: 2025-04-30
Payer: COMMERCIAL

## 2025-04-30 VITALS
HEART RATE: 72 BPM | OXYGEN SATURATION: 98 % | TEMPERATURE: 98.4 F | SYSTOLIC BLOOD PRESSURE: 93 MMHG | RESPIRATION RATE: 16 BRPM | DIASTOLIC BLOOD PRESSURE: 66 MMHG | BODY MASS INDEX: 20.76 KG/M2 | WEIGHT: 140.6 LBS

## 2025-04-30 DIAGNOSIS — N89.8 VAGINAL DISCHARGE: ICD-10-CM

## 2025-04-30 DIAGNOSIS — R82.90 URINE ABNORMALITY: ICD-10-CM

## 2025-04-30 LAB
POC APPEARANCE, URINE: ABNORMAL
POC BILIRUBIN, URINE: NEGATIVE
POC BLOOD, URINE: NEGATIVE
POC COLOR, URINE: ABNORMAL
POC GLUCOSE, URINE: NEGATIVE MG/DL
POC KETONES, URINE: ABNORMAL MG/DL
POC LEUKOCYTES, URINE: ABNORMAL
POC NITRITE,URINE: NEGATIVE
POC PH, URINE: 6.5 PH
POC PROTEIN, URINE: ABNORMAL MG/DL
POC SPECIFIC GRAVITY, URINE: 1.02
POC UROBILINOGEN, URINE: 0.2 EU/DL
PREGNANCY TEST URINE, POC: POSITIVE

## 2025-04-30 PROCEDURE — 99215 OFFICE O/P EST HI 40 MIN: CPT | Performed by: NURSE PRACTITIONER

## 2025-04-30 PROCEDURE — 81025 URINE PREGNANCY TEST: CPT | Performed by: NURSE PRACTITIONER

## 2025-04-30 PROCEDURE — 1036F TOBACCO NON-USER: CPT | Performed by: NURSE PRACTITIONER

## 2025-04-30 PROCEDURE — 81003 URINALYSIS AUTO W/O SCOPE: CPT | Performed by: NURSE PRACTITIONER

## 2025-04-30 ASSESSMENT — PATIENT HEALTH QUESTIONNAIRE - PHQ9
2. FEELING DOWN, DEPRESSED OR HOPELESS: NOT AT ALL
1. LITTLE INTEREST OR PLEASURE IN DOING THINGS: NOT AT ALL
SUM OF ALL RESPONSES TO PHQ9 QUESTIONS 1 AND 2: 0

## 2025-04-30 ASSESSMENT — ENCOUNTER SYMPTOMS
DIARRHEA: 0
DIZZINESS: 0
DIFFICULTY URINATING: 0
HEADACHES: 0
NAUSEA: 0
SHORTNESS OF BREATH: 0
FREQUENCY: 0
COUGH: 0
FLANK PAIN: 0
FEVER: 0
HEMATURIA: 0
ABDOMINAL PAIN: 0
CHILLS: 0
VOMITING: 0
DYSURIA: 0

## 2025-04-30 ASSESSMENT — PAIN SCALES - GENERAL: PAINLEVEL_OUTOF10: 5

## 2025-04-30 NOTE — PROGRESS NOTES
Subjective   Patient ID: Sugey Fonseca is a 24 y.o. female. They present today with a chief complaint of Vaginal Discharge (Currently 8 weeks pregnant and states has thick yellow discharge, with vaginal spelling and dryness x 5-7 days. Denies odor or vaginal pain).    History of Present Illness  This is a 24-year-old female who is 8 weeks pregnant who presents to the clinic today for evaluation of vaginal cramping, thick yellow/mucus discharge, vaginal swelling for the last 7 days.  Denies urgency, frequency, dysuria.  Patient denies concern for STI.  States about 2 weeks ago she had some vaginal spotting.  Denies hematuria, flank pain, nausea, vomiting.           Past Medical History  Allergies as of 04/30/2025    (No Known Allergies)       Prescriptions Prior to Admission[1]     Medical History[2]    Surgical History[3]     reports that she has never smoked. She has never been exposed to tobacco smoke. She has never used smokeless tobacco. She reports that she does not currently use alcohol. She reports that she does not use drugs.    Review of Systems  Review of Systems   Constitutional:  Negative for chills and fever.   Respiratory:  Negative for cough and shortness of breath.    Cardiovascular:  Negative for chest pain.   Gastrointestinal:  Negative for abdominal pain, diarrhea, nausea and vomiting.   Genitourinary:  Positive for pelvic pain and vaginal discharge. Negative for difficulty urinating, dysuria, flank pain, frequency, genital sores, hematuria, menstrual problem, urgency, vaginal bleeding and vaginal pain.   Skin:  Negative for rash.   Neurological:  Negative for dizziness and headaches.   All other systems reviewed and are negative.                                 Objective    Vitals:    04/30/25 0955   BP: 93/66   Pulse: 72   Resp: 16   Temp: 36.9 °C (98.4 °F)   TempSrc: Oral   SpO2: 98%   Weight: 63.8 kg (140 lb 9.6 oz)     Patient's last menstrual period was 02/24/2025 (exact  date).    Physical Exam  Vitals reviewed.   Constitutional:       Appearance: Normal appearance.   HENT:      Head: Normocephalic and atraumatic.   Cardiovascular:      Rate and Rhythm: Normal rate.   Pulmonary:      Breath sounds: Normal breath sounds.   Neurological:      General: No focal deficit present.      Mental Status: She is alert.   Psychiatric:         Mood and Affect: Mood normal.         Procedures    Point of Care Test & Imaging Results from this visit  Results for orders placed or performed in visit on 04/30/25   POCT UA Automated manually resulted   Result Value Ref Range    POC Color, Urine Michelle (A) Straw, Yellow, Light-Yellow    POC Appearance, Urine Hazy (A) Clear    POC Glucose, Urine NEGATIVE NEGATIVE mg/dl    POC Bilirubin, Urine NEGATIVE NEGATIVE    POC Ketones, Urine 15 (1+) (A) NEGATIVE mg/dl    POC Specific Gravity, Urine 1.020 1.005 - 1.035    POC Blood, Urine NEGATIVE NEGATIVE    POC PH, Urine 6.5 No Reference Range Established PH    POC Protein, Urine 15 (1+) (A) NEGATIVE mg/dl    POC Urobilinogen, Urine 0.2 0.2, 1.0 EU/DL    Poc Nitrite, Urine NEGATIVE NEGATIVE    POC Leukocytes, Urine LARGE (3+) (A) NEGATIVE   POCT pregnancy, urine manually resulted   Result Value Ref Range    Preg Test, Ur Positive (A) Negative      Imaging  No results found.    Cardiology, Vascular, and Other Imaging  No other imaging results found for the past 2 days      Diagnostic study results (if any) were reviewed by Kristin L Schoenlein, APRN-CNP.    Assessment/Plan   Allergies, medications, history, and pertinent labs/EKGs/Imaging reviewed by Kristin L Schoenlein, APRN-CNP.     Medical Decision Making  Vitals within normal limits.  Patient in no acute distress and nontoxic-appearing.  hCG positive.  UA positive for 3+ leuks.  Cannot rule out miscarriage or gynecological emergency at this time.    Differential diagnosis include, however not limited to: Miscarriage, PID, vaginitis, STI    Discussed with  patient need to be seen in the emergency room for further evaluation/work-up including, however not limited to, advanced imaging and lab work.  Patient is in agreement with plan and verbalizes understanding.  Patient left the clinic in stable condition via private vehicle.    Orders and Diagnoses  Diagnoses and all orders for this visit:  Vaginal discharge  -     POCT UA Automated manually resulted  -     POCT pregnancy, urine manually resulted  Urine abnormality  -     POCT pregnancy, urine manually resulted      Medical Admin Record      Patient disposition: ED    Electronically signed by Kristin L Schoenlein, APRN-CNP  11:46 AM           [1] (Not in a hospital admission)   [2]   Past Medical History:  Diagnosis Date    39 weeks gestation of pregnancy ([facility]) 08/07/2019    39 weeks gestation of pregnancy    Acne     Acute vaginitis 12/21/2018    Bacterial vaginosis    Adjustment disorder with mixed anxiety and depressed mood 04/27/2017    Adjustment disorder with mixed anxiety and depressed mood    Chlamydial infection, unspecified 07/30/2019    Chlamydia infection    Myopia, bilateral 10/07/2014    Bilateral myopia    Myopia, bilateral 11/05/2019    Myopia of both eyes with astigmatism    Pain in right shoulder 07/30/2020    Right shoulder pain    Personal history of other diseases of the circulatory system 09/06/2019    History of hypertension    Personal history of other diseases of the respiratory system 02/02/2021    History of acute pharyngitis    Personal history of other infectious and parasitic diseases 01/07/2019    History of sexually transmitted disease    Presence of spectacles and contact lenses     Wears glasses    Presence of spectacles and contact lenses     Wears contact lenses    Puncture wound of abdominal wall without foreign body, periumbilic region without penetration into peritoneal cavity, initial encounter 02/02/2021    Pierced belly button infection    Streptococcus, group b, as the  cause of diseases classified elsewhere 2019    Positive GBS test    Supervision of pregnancy with insufficient  care, second trimester ([facility]) 2019    Late prenatal care in second trimester    Unspecified astigmatism, right eye 10/07/2014    Astigmatism of right eye    Urinary tract infection, site not specified 2020    Acute lower UTI    Verruca    [3]   Past Surgical History:  Procedure Laterality Date     SECTION, CLASSIC      DILATION AND CURETTAGE OF UTERUS  2023    OTHER SURGICAL HISTORY  2020     section

## 2025-04-30 NOTE — PATIENT INSTRUCTIONS
You have agreed to be seen in the emergency room following today's visit.  Failure to be seen and delaying care can lead to complications including, however not limited to, long-term disability.

## 2025-05-07 ENCOUNTER — APPOINTMENT (OUTPATIENT)
Dept: DERMATOLOGY | Facility: CLINIC | Age: 25
End: 2025-05-07
Payer: COMMERCIAL

## 2025-05-22 ENCOUNTER — TELEPHONE (OUTPATIENT)
Facility: CLINIC | Age: 25
End: 2025-05-22
Payer: COMMERCIAL

## 2025-05-22 ENCOUNTER — ROUTINE PRENATAL (OUTPATIENT)
Dept: OBSTETRICS AND GYNECOLOGY | Facility: CLINIC | Age: 25
End: 2025-05-22
Payer: COMMERCIAL

## 2025-05-22 ENCOUNTER — LAB (OUTPATIENT)
Dept: LAB | Facility: HOSPITAL | Age: 25
End: 2025-05-22
Payer: COMMERCIAL

## 2025-05-22 VITALS — BODY MASS INDEX: 20.67 KG/M2 | DIASTOLIC BLOOD PRESSURE: 70 MMHG | WEIGHT: 140 LBS | SYSTOLIC BLOOD PRESSURE: 106 MMHG

## 2025-05-22 DIAGNOSIS — Z34.81 PRENATAL CARE, SUBSEQUENT PREGNANCY IN FIRST TRIMESTER: ICD-10-CM

## 2025-05-22 DIAGNOSIS — Z34.80 PRENATAL CARE, SUBSEQUENT PREGNANCY, ANTEPARTUM: Primary | ICD-10-CM

## 2025-05-22 DIAGNOSIS — K62.5 RECTAL BLEEDING: ICD-10-CM

## 2025-05-22 DIAGNOSIS — Z13.1 SCREENING FOR DIABETES MELLITUS: ICD-10-CM

## 2025-05-22 DIAGNOSIS — Z34.82 PRENATAL CARE, SUBSEQUENT PREGNANCY IN SECOND TRIMESTER: ICD-10-CM

## 2025-05-22 PROBLEM — O34.219 HISTORY OF CESAREAN DELIVERY AFFECTING PREGNANCY (HHS-HCC): Status: ACTIVE | Noted: 2025-05-22

## 2025-05-22 LAB
ERYTHROCYTE [DISTWIDTH] IN BLOOD BY AUTOMATED COUNT: 13.2 % (ref 11.5–14.5)
EST. AVERAGE GLUCOSE BLD GHB EST-MCNC: 85 MG/DL
HBA1C MFR BLD: 4.6 % (ref ?–5.7)
HCT VFR BLD AUTO: 42.1 % (ref 36–46)
HGB BLD-MCNC: 13.8 G/DL (ref 12–16)
MCH RBC QN AUTO: 29.1 PG (ref 26–34)
MCHC RBC AUTO-ENTMCNC: 32.8 G/DL (ref 32–36)
MCV RBC AUTO: 89 FL (ref 80–100)
NRBC BLD-RTO: 0 /100 WBCS (ref 0–0)
PLATELET # BLD AUTO: 263 X10*3/UL (ref 150–450)
RBC # BLD AUTO: 4.74 X10*6/UL (ref 4–5.2)
REFLEX ADDED, ANEMIA PANEL: NORMAL
WBC # BLD AUTO: 17.3 X10*3/UL (ref 4.4–11.3)

## 2025-05-22 PROCEDURE — 99213 OFFICE O/P EST LOW 20 MIN: CPT | Performed by: ADVANCED PRACTICE MIDWIFE

## 2025-05-22 PROCEDURE — 83036 HEMOGLOBIN GLYCOSYLATED A1C: CPT

## 2025-05-22 PROCEDURE — 85027 COMPLETE CBC AUTOMATED: CPT

## 2025-05-22 PROCEDURE — 83021 HEMOGLOBIN CHROMOTOGRAPHY: CPT

## 2025-05-22 PROCEDURE — 83020 HEMOGLOBIN ELECTROPHORESIS: CPT

## 2025-05-22 RX ORDER — HYDROCORTISONE ACETATE 25 MG/1
25 SUPPOSITORY RECTAL 2 TIMES DAILY
Qty: 12 SUPPOSITORY | Refills: 0 | Status: SHIPPED | OUTPATIENT
Start: 2025-05-22 | End: 2025-06-01

## 2025-05-22 ASSESSMENT — PATIENT HEALTH QUESTIONNAIRE - PHQ9
SUM OF ALL RESPONSES TO PHQ9 QUESTIONS 1 & 2: 0
1. LITTLE INTEREST OR PLEASURE IN DOING THINGS: NOT AT ALL
2. FEELING DOWN, DEPRESSED OR HOPELESS: NOT AT ALL

## 2025-05-22 NOTE — PROGRESS NOTES
PLAN:  No problem-specific Assessment & Plan notes found for this encounter.    ASSESSESMENT:  1. Prenatal care, subsequent pregnancy, antepartum  C. trachomatis / N. gonorrhoeae, Amplified, Urogenital    Hemoglobin A1C    Hepatitis B Surface Antigen    Hepatitis C Antibody    Myriad Prequel Prenatal Screen    Syphilis Screen with Reflex    Type And Screen Is this order related to pregnancy or an upcoming surgery? Yes; Where will this surgery/delivery be performed? [facility]; What is the date of the surgery? 12/3/2025 (SEAN); Has this patient ever had a transfusion? No; Has...    US MAC OB imaging order    Myriad Prequel Prenatal Screen    Hemoglobin A1C    Hepatitis B Surface Antigen    Hepatitis C Antibody    Syphilis Screen with Reflex    Type And Screen Is this order related to pregnancy or an upcoming surgery? Yes; Where will this surgery/delivery be performed? [facility]; What is the date of the surgery? 12/3/2025 (SEAN); Has this patient ever had a transfusion? No; Has...      2. Prenatal care, subsequent pregnancy in second trimester  CBC Anemia Panel With Reflex,Pregnancy    CBC Anemia Panel With Reflex,Pregnancy      3. Prenatal care, subsequent pregnancy in first trimester  Hemoglobin Identification with Path Review    Hemoglobin Identification with Path Review          She is here for Unknown OB visit.  Denies cramping, leaking of fluid, or bleeding     Initial OB done at today's visit.   She is c/o rectal bleeding.   On exam she does have a very minor hemorrhoid.   Will send in script for suppositories.   Reviewed relief measures,  Back in office asap for real NOB.  All labs ordered.   Education provide to the best of my ability    PHYSICAL EXAM:   /70   Wt 63.5 kg (140 lb)   LMP 02/24/2025 (Exact Date)   BMI 20.67 kg/m²   I have reviewed her pertinent history, lab results, medications and problem list.  See Pregnancy episode for any changes.  Well appearing, Alert &  oriented  Skin warm & dry  Normal range of motion in all extremities.    Abdomen soft  nontender  Normal resp effort    ARTEMIO Palencia-SOPHIE   05/22/25    Follow up in about 4 weeks (around 6/19/2025) for SULTANA.

## 2025-05-22 NOTE — PATIENT INSTRUCTIONS
TAKING GOOD CARE OF YOURSELF WHILE YOU ARE PREGNANT    What Should I Eat?  You do not have to eat a lot more food during pregnancy. But it is important to eat the right food--the most  healthy food for you and your baby. Every day, make sure you have:  6 to 8 large glasses of water.  6 to 9 servings of whole grain foods like bread or pasta. By reading the label, you will know that you are getting ‘‘whole’’ grain and not just brown-colored bread or pasta (1 slice of bread or a half cup of cooked pasta is a serving).  3 to 4 servings of fruit. Fresh, raw fruit is best (1 small apple or a half cup of chopped fruit is a serving).  4 to 5 servings of vegetables (1 medium carrot or a half cup of chopped vegetables is a serving).  2 to 3 servings of lean meat, fish, eggs, or nuts. (A piece ofmeat the size of a pack of playing cards is 1 serving.)  1 serving of vitamin C-rich food, like oranges, sweet peppers, or tomatoes (one half cup is a serving).  2 to 3 servings of iron-rich foods, like black-eyed peas, sweet potatoes, greens, dried fruit, or meat.  1 serving of a food rich in folic acid, like dark green, leafy vegetables (one half cup is a serving).    Are Some Foods Dangerous?  Most women can eat any food they want while they are pregnant. But there are some foods that can be  dangerous to the health of your baby.    Fish -- Fish is good food. And it is an important food for growing a smart baby. But some fish have lots of dangerous chemicals. To avoid these chemicals:  Do not eat swordfish, shark, norm mackerel, or tilefish.  Eat salmon no more than 1 time per week.  Eat only ‘‘light’’ tuna. Do not eat albacore tuna.    Milk and cheese -- Dairy products are an important source of calcium, and calcium helps build strong bones and teeth. But some dairy products carry dangerous germs. To keep yourself and your baby safe, eat and drink only dairy products--such as milk, yogurt, and cheese--that are  pasteurized.    Prepared foods -- Any food that is spoiled or not cooked well can make you sick.  Do not eat any meat or fish that has not been cooked all the way through.  Do not eat any cooked food that has not been kept hot or chilled.  Wash knives, cutting boards, and your hands between handling raw meat and any other food--like fruits and vegetables--that you plan to eat raw.  Wash all fruits and vegetables with 1 tablespoon of vinegar in a pan of water to kill germs before you eat them.    Alcohol -- We know that alcohol is dangerous for your baby if you drink a lot during your pregnancy. It is safest to avoid all alcohol.    Coffee -- The most recent studies say that 2 cups of caffeinated drink each day is safe during pregnancy. This means 2 small cups of coffee or tea or 1 can of caffeinated soda.    Weight Gain  On average, the total amount of weight gain during pregnancy will fall in the following ranges:    Weight Type Average Pounds   Normal weight (BMI 18.5 - 24.9) 25 - 35 pounds   Underweight (BMI less than 18.5) 28 - 40 pounds   Overweight (BMI 25 - 29.9) 15 - 25 pounds   Obese (BMI greater or equal to 30) 11 - 20 pounds       Do I Need to Take Vitamins?  Even if your diet is good, a daily multivitamin is a good idea. All prenatal vitamins are pretty much the same,  so buy the cheapest kind. If you find that your vitamins upset your stomach, take a children’s chewable or gummy  vitamin. Be sure you get at least 400 micrograms of folic acid every day in the vitamin you chose. The number  of micrograms of folic acid is on the label of the bottle.    Is Exercise Important?  Yes! You are getting ready for an athletic event: labor! Daily exercise will help you stay fit, control your  weight, and be prepared for labor. Every day, try to get at least 30 minutes of moderate exercise like walking  or swimming. Do deep squats several times a day. This exercise will help control low back pain and help  prepare  your pelvis for delivery.    Are Some Exercises Dangerous?  You can continue to do pretty much any exercise you have been doing. It is important to avoid any danger of  blows to your stomach. You should avoid scuba diving and contact sports.    What if I Get Sick--Can I Take Medicine?  It is important to limit the medicines you take as much as possible. It is safe to take acetaminophen  (Tylenol). Avoid NSAIDs like ibuprofen (Motrin) and naproxen (Aleve).    Head cold -- Drink lots of fluids, gargle with warm salt water, take warm baths or showers, take Tylenol for headache and sore throat, suck on throat lozenges    Headaches -- Drink at least 8 big glasses of water every day, eat something healthy every 2 to 3 hours during the day, and take Tylenol    Constipation -- Drink lots of water, eat lots of fruits and vegetables, including dried fruits like prunes, and use a fiber supplement like Metamucil    Are There Danger Signs That I Need to Watch Out For?  Call your health care provider if:  You start to bleed like a period  You are leaking fluid  Your baby is not moving (after 24 weeks into your pregnancy)  You are having very bad headaches or your vision is blurry or you see ‘‘spots’’  You are having very bad pain  You are feeling very frightened or sad  You are very worried about something  NIPT testing:  NIPT Summary of Recommendations  Prenatal genetic screening (serum screening with or without nuchal translucency [NT] ultrasound or cell-free DNA screening) and diagnostic testing (chorionic villus sampling [CVS] or amniocentesis) options should be discussed and offered to all pregnant patients regardless of maternal age or risk of chromosomal abnormality. After review and discussion, every patient has the right to pursue or decline prenatal genetic screening and diagnostic testing.  If screening is accepted, patients should have one prenatal screening approach, and should not have multiple screening tests  performed simultaneously.  Cell-free DNA is the most sensitive and specific screening test for the common fetal aneuploidies. Nevertheless, it has the potential for false-positive and false-negative results. Furthermore, cell-free DNA testing is not equivalent to diagnostic testing.  All patients should be offered a second-trimester ultrasound for fetal structural defects, since these may occur with or without fetal aneuploidy; ideally this procedure is performed between 18 and 22 weeks of gestation (with or without second?trimester maternal serum alpha?fetoprotein).  Patients with a positive screening test result for fetal aneuploidy should undergo genetic counseling and a comprehensive ultrasound evaluation with an opportunity for diagnostic testing to confirm results.  Patients with a negative screening test result should be made aware that this substantially decreases their risk of the targeted aneuploidy but does not ensure that the fetus is unaffected. The potential for a fetus to be affected by genetic disorders that are not evaluated by the screening or diagnostic test should also be reviewed. Even if patients have a negative screening test result, they may choose diagnostic testing later in pregnancy, particularly if additional findings such as fetal anomalies identified on ultrasound examination become evident.  Patients whose cell-free DNA screening test results are not reported by the laboratory or are uninterpretable (a no?call test result) should be informed that test failure is associated with an increased risk of aneuploidy, receive further genetic counseling and be offered comprehensive ultrasound evaluation and diagnostic testing.  If an enlarged nuchal translucency or an anomaly is identified on ultrasound examination, the patient should be offered genetic counseling and diagnostic testing for genetic conditions and a comprehensive ultrasound evaluation including detailed ultrasonography at 18-22  weeks of gestation to assess for structural abnormalities.  The use of cell-free DNA screening as follow-up for patients with a screen positive serum analyte screening test result is an option for patients who want to avoid a diagnostic test. However, patients should be informed that this approach may delay definitive diagnosis and will fail to identify some fetuses with chromosomal abnormalities.  In clinical situations of an isolated soft ultrasonographic marker (such as echogenic cardiac focus, choroid plexus cyst, pyelectasis, short humerus or femur length) where aneuploidy screening has not been performed, the patient should be counseled regarding the risk of aneuploidy associated with the finding and cell-free DNA, quad screen testing, or amniocentesis should be offered. If aneuploidy testing is performed and is low risk, then no further risk assessment is needed. If more than one marker is identified, then genetic counseling, maternal-fetal medicine consultation, or both are recommended.  No method of aneuploidy screening that includes a serum sample is as accurate in twin gestations as it is in chen pregnancies; this information should be incorporated into pretest counseling for patients with multiple gestations.  Cell-free DNA screening can be performed in twin pregnancies. Overall, performance of screening for trisomy 21 by cell-free DNA in twin pregnancies is encouraging, but the total number of reported affected cases is small. Given the small number of affected cases it is difficult to determine an accurate detection rate for trisomy 18 and 13.  Because preimplantation genetic testing is not uniformly accurate, prenatal screening and prenatal diagnosis should be offered to all patients regardless of previous preimplantation genetic testing.  The use of multiple serum screening approaches performed independently (eg, a first-trimester screening test followed by a quad screen as an unlinked test) is  not recommended because it will result in an unacceptably high positive screening rate and could deliver contradictory risk estimates.  In multifetal gestations, if a fetal demise, vanishing twin, or anomaly is identified in one fetus, there is a significant risk of an inaccurate test result if serum-based aneuploidy screening or cell-free DNA is used. This information should be reviewed with the patient and diagnostic testing should be offered.  Patients with unusual or multiple aneuploidies detected by cell-free DNA should be referred for genetic counseling and maternal-fetal medicine consultation.  Our contact information is below in case you or your family need to call:  Your health care provider’s name: JUMANA Palencia  Your health care provider’s phone number: [phone]

## 2025-05-23 LAB
C TRACH RRNA SPEC QL NAA+PROBE: NOT DETECTED
EST. AVERAGE GLUCOSE BLD GHB EST-MCNC: 105 MG/DL
EST. AVERAGE GLUCOSE BLD GHB EST-SCNC: 5.8 MMOL/L
HBA1C MFR BLD: 5.3 %
HBV SURFACE AG SERPL QL IA: NORMAL
HCV AB SERPL QL IA: NORMAL
HEMOGLOBIN A2: 3 % (ref 2–3.5)
HEMOGLOBIN A: 96.6 % (ref 95.8–98)
HEMOGLOBIN F: 0.4 % (ref 0–2)
HEMOGLOBIN IDENTIFICATION INTERPRETATION: NORMAL
N GONORRHOEA RRNA SPEC QL NAA+PROBE: NOT DETECTED
PATH REVIEW-HGB IDENTIFICATION: NORMAL
QUEST GC CT AMPLIFIED (ALWAYS MESSAGE): NORMAL
T PALLIDUM AB SER QL IA: NORMAL

## 2025-05-27 ENCOUNTER — HOSPITAL ENCOUNTER (OUTPATIENT)
Dept: RADIOLOGY | Facility: CLINIC | Age: 25
Discharge: HOME | End: 2025-05-27
Payer: COMMERCIAL

## 2025-05-27 DIAGNOSIS — N91.2 AMENORRHEA: ICD-10-CM

## 2025-05-27 PROCEDURE — 76813 OB US NUCHAL MEAS 1 GEST: CPT | Performed by: OBSTETRICS & GYNECOLOGY

## 2025-05-27 PROCEDURE — 76813 OB US NUCHAL MEAS 1 GEST: CPT

## 2025-05-28 LAB
EST. AVERAGE GLUCOSE BLD GHB EST-MCNC: 105 MG/DL
EST. AVERAGE GLUCOSE BLD GHB EST-SCNC: 5.8 MMOL/L
HBA1C MFR BLD: 5.3 %
HBV SURFACE AG SERPL QL IA: NORMAL
HCV AB SERPL QL IA: NORMAL
T PALLIDUM AB SER QL IA: NEGATIVE

## 2025-05-30 LAB
COMMENTS - MP RESULT TYPE: NORMAL
SCAN RESULT: NORMAL

## 2025-06-13 ENCOUNTER — APPOINTMENT (OUTPATIENT)
Dept: DERMATOLOGY | Facility: CLINIC | Age: 25
End: 2025-06-13
Payer: COMMERCIAL

## 2025-06-16 ENCOUNTER — HOSPITAL ENCOUNTER (OUTPATIENT)
Facility: HOSPITAL | Age: 25
End: 2025-06-16
Attending: OBSTETRICS & GYNECOLOGY | Admitting: OBSTETRICS & GYNECOLOGY
Payer: COMMERCIAL

## 2025-06-16 ENCOUNTER — HOSPITAL ENCOUNTER (OUTPATIENT)
Facility: HOSPITAL | Age: 25
Discharge: HOME | End: 2025-06-16
Attending: OBSTETRICS & GYNECOLOGY | Admitting: OBSTETRICS & GYNECOLOGY
Payer: COMMERCIAL

## 2025-06-16 ENCOUNTER — HOSPITAL ENCOUNTER (EMERGENCY)
Facility: HOSPITAL | Age: 25
Discharge: LEFT BEFORE DISCHARGE | End: 2025-06-16
Payer: COMMERCIAL

## 2025-06-16 ENCOUNTER — APPOINTMENT (OUTPATIENT)
Dept: OBSTETRICS AND GYNECOLOGY | Facility: CLINIC | Age: 25
End: 2025-06-16
Payer: COMMERCIAL

## 2025-06-16 VITALS
HEIGHT: 69 IN | HEART RATE: 107 BPM | TEMPERATURE: 98.1 F | BODY MASS INDEX: 20.57 KG/M2 | SYSTOLIC BLOOD PRESSURE: 129 MMHG | RESPIRATION RATE: 18 BRPM | WEIGHT: 138.89 LBS | OXYGEN SATURATION: 100 % | DIASTOLIC BLOOD PRESSURE: 58 MMHG
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
## 2025-06-16 PROCEDURE — 99213 OFFICE O/P EST LOW 20 MIN: CPT | Performed by: OBSTETRICS & GYNECOLOGY

## 2025-06-16 PROCEDURE — 4500999001 HC ED NO CHARGE

## 2025-06-16 PROCEDURE — 99213 OFFICE O/P EST LOW 20 MIN: CPT

## 2025-06-16 SDOH — HEALTH STABILITY: MENTAL HEALTH: WISH TO BE DEAD (PAST 1 MONTH): NO

## 2025-06-16 SDOH — HEALTH STABILITY: MENTAL HEALTH: NON-SPECIFIC ACTIVE SUICIDAL THOUGHTS (PAST 1 MONTH): NO

## 2025-06-16 SDOH — SOCIAL STABILITY: SOCIAL INSECURITY: ARE YOU OR HAVE YOU BEEN THREATENED OR ABUSED PHYSICALLY, EMOTIONALLY, OR SEXUALLY BY ANYONE?: NO

## 2025-06-16 SDOH — SOCIAL STABILITY: SOCIAL INSECURITY: ARE THERE ANY APPARENT SIGNS OF INJURIES/BEHAVIORS THAT COULD BE RELATED TO ABUSE/NEGLECT?: NO

## 2025-06-16 SDOH — HEALTH STABILITY: MENTAL HEALTH: SUICIDAL BEHAVIOR (LIFETIME): NO

## 2025-06-16 SDOH — SOCIAL STABILITY: SOCIAL INSECURITY: HAS ANYONE EVER THREATENED TO HURT YOUR FAMILY OR YOUR PETS?: NO

## 2025-06-16 SDOH — SOCIAL STABILITY: SOCIAL INSECURITY: ABUSE SCREEN: ADULT

## 2025-06-16 SDOH — SOCIAL STABILITY: SOCIAL INSECURITY: HAVE YOU HAD THOUGHTS OF HARMING ANYONE ELSE?: NO

## 2025-06-16 SDOH — HEALTH STABILITY: MENTAL HEALTH: WERE YOU ABLE TO COMPLETE ALL THE BEHAVIORAL HEALTH SCREENINGS?: YES

## 2025-06-16 SDOH — SOCIAL STABILITY: SOCIAL INSECURITY: DOES ANYONE TRY TO KEEP YOU FROM HAVING/CONTACTING OTHER FRIENDS OR DOING THINGS OUTSIDE YOUR HOME?: NO

## 2025-06-16 SDOH — SOCIAL STABILITY: SOCIAL INSECURITY: DO YOU FEEL ANYONE HAS EXPLOITED OR TAKEN ADVANTAGE OF YOU FINANCIALLY OR OF YOUR PERSONAL PROPERTY?: NO

## 2025-06-16 SDOH — SOCIAL STABILITY: SOCIAL INSECURITY: HAVE YOU HAD ANY THOUGHTS OF HARMING ANYONE ELSE?: NO

## 2025-06-16 SDOH — SOCIAL STABILITY: SOCIAL INSECURITY: PHYSICAL ABUSE: DENIES

## 2025-06-16 SDOH — ECONOMIC STABILITY: HOUSING INSECURITY: DO YOU FEEL UNSAFE GOING BACK TO THE PLACE WHERE YOU ARE LIVING?: NO

## 2025-06-16 SDOH — SOCIAL STABILITY: SOCIAL INSECURITY: VERBAL ABUSE: DENIES

## 2025-06-16 ASSESSMENT — PAIN SCALES - GENERAL: PAINLEVEL_OUTOF10: 0 - NO PAIN

## 2025-06-16 ASSESSMENT — PATIENT HEALTH QUESTIONNAIRE - PHQ9
2. FEELING DOWN, DEPRESSED OR HOPELESS: NOT AT ALL
SUM OF ALL RESPONSES TO PHQ9 QUESTIONS 1 & 2: 0
1. LITTLE INTEREST OR PLEASURE IN DOING THINGS: NOT AT ALL

## 2025-06-16 ASSESSMENT — LIFESTYLE VARIABLES
AUDIT-C TOTAL SCORE: 0
SKIP TO QUESTIONS 9-10: 1
AUDIT-C TOTAL SCORE: 0
HOW OFTEN DO YOU HAVE 6 OR MORE DRINKS ON ONE OCCASION: NEVER
HOW OFTEN DO YOU HAVE A DRINK CONTAINING ALCOHOL: NEVER
HOW MANY STANDARD DRINKS CONTAINING ALCOHOL DO YOU HAVE ON A TYPICAL DAY: PATIENT DOES NOT DRINK

## 2025-06-16 NOTE — H&P
OB Triage H&P    Assessment/Plan    Sugey Fonseca is a 24 y.o.  at Unknown, SEAN: Not found., who presents to triage with mild cramping after a fall this morning.    Plan  BSUS    -Up to date on prenatal care  -Continue routine prenatal care    Dispo  -Patient appropriate for discharge home, agrees with plan  -Return precautions discussed   -Follow up at next scheduled OB appointment or to triage sooner as needed        Pregnancy Problems (from 24 to present)       Problem Noted Diagnosed Resolved    History of  delivery affecting pregnancy ([facility]) 2025 by JUMANA Palencia  No    Priority:  Medium       Overview Signed 2025  2:31 PM by JUMANA Palencia   2025  Undecided as to how she would like to deliver. SGP         Genital herpes 2023 by Tarah Pulliam  No    Priority:  Medium       Overview Signed 2025  2:32 PM by JUMANA Palencia   2025  Will start ppx at 36 weeks. SGP                 Subjective     Pt presents for evaluation after falling off of a bed this morning. She states the bed is elevated and she landed on her abdomen. Was feeling some cramping and sharp pains initially but this has resolved. She has not yet been feeling fetal movement. She denies vaginal bleeding, leaking of fluid.    Prenatal Provider Franki BARRIOS    OB History    Para Term  AB Living   3 1 1 0 1 1   SAB IAB Ectopic Multiple Live Births   1 0 0 0 1      # Outcome Date GA Lbr Dante/2nd Weight Sex Type Anes PTL Lv   3 Current            2 SAB            1 Term 10/09/19    F CS-LTranv   PRINCESS      Complications: Breech birth ([facility])       Surgical History[1]    Social History     Tobacco Use    Smoking status: Never     Passive exposure: Never    Smokeless tobacco: Never   Substance Use Topics    Alcohol use: Not Currently     Comment: occasionally       Allergies[2]    Prescriptions Prior to  Admission[3]  Objective     Last Vitals  Temp Pulse Resp BP MAP O2 Sat   36.7 °C (98.1 °F) 107 18 129/58 84 100 %     Blood Pressures         2025  1144             BP: 129/58             Physical Exam  General: NAD, mood appropriate  Cardiopulmonary: warm and well perfused, breathing comfortably on room air  Abdomen: Gravid, non-tender  Extremities: Symmetric  Speculum Exam: deferred  Cervix: N/E    Chaperone Present: Yes.  Chaperone Name/Title: Belkis LEON RN  Examination Chaperoned: Entire Physical Exam     Fetal Monitoring  Baseline: 140's bpm on doppler    Bedside ultrasound: Yes Active fetus in breech presentation, adequate appearing fluid, anterior placenta    Labs in chart were reviewed.  Blood type O POS          Prenatal labs reviewed, not remarkable.             [1]   Past Surgical History:  Procedure Laterality Date     SECTION, CLASSIC  2019    DILATION AND CURETTAGE OF UTERUS  2023    OTHER SURGICAL HISTORY  2020     section   [2] No Known Allergies  [3]   Medications Prior to Admission   Medication Sig Dispense Refill Last Dose/Taking    prenatal vitamin, iron-folic, 27 mg iron-800 mcg folic acid tablet Take 1 tablet by mouth once daily. 90 tablet 3 2025    clindamycin (Cleocin T) 1 % lotion Apply topically twice daily to face (Patient not taking: Reported on 2025) 60 mL 11     doxycycline (Vibramycin) 100 mg capsule Start 1 day prior to your HSG. Take 1 tab BID po. Take with at least 8 ounces (large glass) of water, do not lie down for 30 minutes after (Patient not taking: Reported on 2025) 10 capsule 0     methenamine mandelate (Mandelamine) 1 gram tablet Take 1 tablet (1 g) by mouth 2 times a day. (Patient not taking: Reported on 2025) 60 tablet 11

## 2025-06-26 ENCOUNTER — APPOINTMENT (OUTPATIENT)
Dept: OBSTETRICS AND GYNECOLOGY | Facility: CLINIC | Age: 25
End: 2025-06-26
Payer: COMMERCIAL

## 2025-06-26 VITALS — SYSTOLIC BLOOD PRESSURE: 100 MMHG | BODY MASS INDEX: 20.97 KG/M2 | WEIGHT: 142 LBS | DIASTOLIC BLOOD PRESSURE: 72 MMHG

## 2025-06-26 DIAGNOSIS — O34.219 HISTORY OF CESAREAN DELIVERY AFFECTING PREGNANCY (HHS-HCC): ICD-10-CM

## 2025-06-26 DIAGNOSIS — A60.00 HERPES SIMPLEX INFECTION OF GENITOURINARY SYSTEM: ICD-10-CM

## 2025-06-26 DIAGNOSIS — O09.292: Primary | ICD-10-CM

## 2025-06-26 DIAGNOSIS — Z3A.17 17 WEEKS GESTATION OF PREGNANCY (HHS-HCC): ICD-10-CM

## 2025-06-26 PROBLEM — R63.4 WEIGHT LOSS, UNINTENTIONAL: Status: RESOLVED | Noted: 2023-02-24 | Resolved: 2025-06-26

## 2025-06-26 PROBLEM — Z87.59 HISTORY OF SEVERE PRE-ECLAMPSIA: Status: ACTIVE | Noted: 2025-06-26

## 2025-06-26 PROBLEM — E55.9 VITAMIN D DEFICIENCY: Status: RESOLVED | Noted: 2023-02-24 | Resolved: 2025-06-26

## 2025-06-26 PROBLEM — H91.93 HEARING LOSS, BILATERAL: Status: RESOLVED | Noted: 2023-02-24 | Resolved: 2025-06-26

## 2025-06-26 PROBLEM — D64.9 ANEMIA: Status: RESOLVED | Noted: 2023-02-24 | Resolved: 2025-06-26

## 2025-06-26 PROBLEM — H90.3 BILATERAL SENSORINEURAL HEARING LOSS: Status: RESOLVED | Noted: 2023-02-24 | Resolved: 2025-06-26

## 2025-06-26 PROCEDURE — 99214 OFFICE O/P EST MOD 30 MIN: CPT | Performed by: ADVANCED PRACTICE MIDWIFE

## 2025-06-26 RX ORDER — NAPROXEN SODIUM 220 MG/1
162 TABLET, FILM COATED ORAL DAILY
Qty: 60 TABLET | Refills: 8 | Status: SHIPPED | OUTPATIENT
Start: 2025-06-26 | End: 2025-07-26

## 2025-06-26 ASSESSMENT — EDINBURGH POSTNATAL DEPRESSION SCALE (EPDS)
I HAVE BEEN SO UNHAPPY THAT I HAVE HAD DIFFICULTY SLEEPING: NOT AT ALL
I HAVE FELT SCARED OR PANICKY FOR NO GOOD REASON: NO, NOT AT ALL
TOTAL SCORE: 0
I HAVE BEEN ABLE TO LAUGH AND SEE THE FUNNY SIDE OF THINGS: AS MUCH AS I ALWAYS COULD
THINGS HAVE BEEN GETTING ON TOP OF ME: NO, I HAVE BEEN COPING AS WELL AS EVER
I HAVE BEEN SO UNHAPPY THAT I HAVE BEEN CRYING: NO, NEVER
I HAVE BEEN ANXIOUS OR WORRIED FOR NO GOOD REASON: NO, NOT AT ALL
I HAVE FELT SAD OR MISERABLE: NO, NOT AT ALL
THE THOUGHT OF HARMING MYSELF HAS OCCURRED TO ME: NEVER
I HAVE LOOKED FORWARD WITH ENJOYMENT TO THINGS: AS MUCH AS I EVER DID
I HAVE BLAMED MYSELF UNNECESSARILY WHEN THINGS WENT WRONG: NO, NEVER

## 2025-06-26 NOTE — PATIENT INSTRUCTIONS
Neville Mayes,    I did a thorough review of your medical records after you left today and noticed that you had preeclampsia after the birth of your daughter. I would like you to have an additional blood and urine test done to check your kidney and liver function so that we have baseline levels for this pregnancy. These were not collected with your previous labs. I placed an order for them and you can have them done at any  lab whenever is convenient for you but I recommend you get them done as soon as possible. Also, we recommend that you take two baby aspirins (162mg total) daily for the remainder of your pregnancy. Evidence has shown that taking baby aspirin in pregnancy reduces the risk of preeclampsia. Typically we like to initiate this after 12 weeks but prior to 16 weeks, however, due to your history of preeclampsia, I think it is beneficial to still take it. You can get this over the counter, but I will also send an order for it to your pharmacy on file.     Please call or come to triage if you experience an unrelenting headache, vision changes, shortness of breath, chest pain, or pain under your right rib cage. These can be signs of preeclampsia.     I'm also including new pregnancy info below.     It was nice to meet you today!    Joanna    TAKING GOOD CARE OF YOURSELF WHILE YOU ARE PREGNANT    What Should I Eat?  You do not have to eat a lot more food during pregnancy. But it is important to eat the right food--the most  healthy food for you and your baby. Every day, make sure you have:  6 to 8 large glasses of water.  6 to 9 servings of whole grain foods like bread or pasta. By reading the label, you will know that you are getting ‘‘whole’’ grain and not just brown-colored bread or pasta (1 slice of bread or a half cup of cooked pasta is a serving).  3 to 4 servings of fruit. Fresh, raw fruit is best (1 small apple or a half cup of chopped fruit is a serving).  4 to 5 servings of vegetables (1 medium  carrot or a half cup of chopped vegetables is a serving).  2 to 3 servings of lean meat, fish, eggs, or nuts. (A piece ofmeat the size of a pack of playing cards is 1 serving.)  1 serving of vitamin C-rich food, like oranges, sweet peppers, or tomatoes (one half cup is a serving).  2 to 3 servings of iron-rich foods, like black-eyed peas, sweet potatoes, greens, dried fruit, or meat.  1 serving of a food rich in folic acid, like dark green, leafy vegetables (one half cup is a serving).    Are Some Foods Dangerous?  Most women can eat any food they want while they are pregnant. But there are some foods that can be  dangerous to the health of your baby.    Fish -- Fish is good food. And it is an important food for growing a smart baby. But some fish have lots of dangerous chemicals. To avoid these chemicals:  Do not eat swordfish, shark, norm mackerel, or tilefish.  Eat salmon no more than 1 time per week.  Eat only ‘‘light’’ tuna. Do not eat albacore tuna.    Milk and cheese -- Dairy products are an important source of calcium, and calcium helps build strong bones and teeth. But some dairy products carry dangerous germs. To keep yourself and your baby safe, eat and drink only dairy products--such as milk, yogurt, and cheese--that are pasteurized.    Prepared foods -- Any food that is spoiled or not cooked well can make you sick.  Do not eat any meat or fish that has not been cooked all the way through.  Do not eat any cooked food that has not been kept hot or chilled.  Wash knives, cutting boards, and your hands between handling raw meat and any other food--like fruits and vegetables--that you plan to eat raw.  Wash all fruits and vegetables with 1 tablespoon of vinegar in a pan of water to kill germs before you eat them.    Alcohol -- We know that alcohol is dangerous for your baby if you drink a lot during your pregnancy. It is safest to avoid all alcohol.    Coffee -- The most recent studies say that 2 cups of  caffeinated drinks each day is safe during pregnancy. This means 2 small cups of coffee/tea or 1 can of caffeinated soda.    Weight Gain  On average, the total amount of weight gain during pregnancy will fall in the following ranges:    Weight Type Average Pounds   Normal weight (BMI 18.5 - 24.9) 25 - 35 pounds   Underweight (BMI less than 18.5) 28 - 40 pounds   Overweight (BMI 25 - 29.9) 15 - 25 pounds   Obese (BMI greater or equal to 30) 11 - 20 pounds       Do I Need to Take Vitamins?  Even if your diet is good, a daily multivitamin is a good idea. All prenatal vitamins are pretty much the same,  so buy the cheapest kind. If you find that your vitamins upset your stomach, take a children’s chewable or gummy  vitamin. Be sure you get at least 400 micrograms of folic acid every day in the vitamin you chose. The number  of micrograms of folic acid is on the label of the bottle. Folic acid is especially important during early pregnancy,   because it helps the neural tube--which becomes the brain and spine--develop. It is the only form of folate that's   been shown to help prevent serious birth defects called neural tube defects (NTDs).    Is Exercise Important?  Yes! You are getting ready for an athletic event: labor! Daily exercise will help you stay fit, control your  weight, and be prepared for labor. Every day, try to get at least 30 minutes of moderate exercise like walking  or swimming. Do deep squats several times a day. This exercise will help control low back pain and help  prepare your pelvis for delivery.    Are Some Exercises Dangerous?  You can continue to do pretty much any exercise you have been doing. It is important to avoid any danger of  blows to your stomach. You should avoid scuba diving and contact sports.    What if I Get Sick--Can I Take Medicine?  It is important to limit the medicines you take as much as possible. It is safe to take acetaminophen  (Tylenol). Avoid NSAIDs like ibuprofen  (Motrin) and naproxen (Aleve).    Head cold -- Drink lots of fluids, gargle with warm salt water, take warm baths or showers, take Tylenol for headache and sore throat, suck on throat lozenges. Sudafed and Robitussin are safe.    Headaches -- Drink at least 8 big glasses of water every day, eat something healthy every 2 to 3 hours during the day, and take Tylenol    Constipation -- Drink lots of water, eat lots of fruits and vegetables, including dried fruits like prunes, and use a fiber supplement like Metamucil    Heartburn --  Tums, Pepcid, or Papaya extract for any upset stomach or heartburn    Allergy symptoms  -- Zyrtec, Claritin, and Benadryl are safe    [employer] is an cain that is great for what medications are safe to take throughout your pregnancy and breastfeeding journey through the first year of life!    Comfort Measures    Yoga is good for your ligaments and mentally preparing for baby and labor.  A prenatal yoga class is recommended.     Prenatal massages are relaxing and safe.     A maternity support belt is an amazing thing that can help back and abdominal pain. They can be purchased on [employer] and [employer].     Good supportive shoes are key!     Chiropractors that are [employer] Certified are great for alleviation of ligament pain    Are There Danger Signs That I Need to Watch Out For?  Call your health care provider if:  You start to bleed like a period  You are leaking fluid  Your baby is not moving (after 24 weeks into your pregnancy)  You are having very bad headaches or your vision is blurry or you see ‘‘spots’’  You are having very bad pain  You are feeling very frightened or sad  You are very worried about something    Who do I contact if I have a question or concern?  To call for questions regarding your care of if you are in labor, please call [phone]  After hours, the answering service will ask you where you intended to give birth and connect you to the midwife on call at  [facility] or the [facility].  If not urgent, you can also send a message through [employer]    When do I get an ultrasound?  There are two ultrasounds that are recommended during your pregnancy.    A nuchal translucency is done between 11-13 weeks and is checking for any structural defects that are obvious at this early gestation.    An anatomy scan will be done at approximately 19-20 weeks to look at all structures.  An order has been placed in [employer] to get these scheduled.  Based on risk factors and any concerns the maternal fetal medicine provider has, you may need a repeat ultrasound.    Healthy pregnancies that do not have any other concerns by the midwife or maternal fetal medicine do not have any repeat ultrasounds done.    What is cell free DNA?  Cell free DNA is a test that can be done at 10 weeks by a blood sample taken from you that can determine if your baby is low risk or high risk for trisomy 21 (Down Syndrome), trisomy 13,  and trisomy 18.  This test can also tell you the fetal sex - only if you want to know.    What labs will I need to have done?  An order will be placed at your initial OB visit for a standard panel of new ob labs.  Please get those done at the time of your ultrasound.  They will collect multiple tubes of blood for new ob labs and also urine for STI testing   and a urine culture.   If there are any concerns with any blood work or urine testing WE WILL CALL YOU OR COMMUNICATE VIA [email or web address]!!!   The biggest concerns our patients have is when they see    their complete blood count.  The reference range in the result is for a non-pregnant person.  We will notify you if there is any need to start an iron supplement.  At 26-28 weeks a glucose test is ordered to see if you have gestational diabetes.  We also reassess if you have anemia, which can be common in pregnancy  Group B strep culture will be done at 36 weeks gestation.    How frequently will you come for your  "prenatal appointments?  We will see you in the office every 4 weeks until you are 30 weeks, then every 2 weeks until 36 weeks and then weekly until your baby is born.    Choices for care and hospital for birth:  I am a Certified Nurse Midwife and practice in a group setting, which means that any of the midwives in my group practice may be there for your birth.  We care for healthy females during pregnancy and labor/birth.  We practice in collaboration with physicians within our group.  If there are any concerns with your pregnancy, labor or birth our physicians work closely with us.      There are certain medical conditions that \"risks you out\" of midwifery care that we are routinely assessing for.  Some conditions during your pregnancy that would risk you out of midwifery care would be:   Severe growth restriction of your baby   Labor/Birth  less than 35 weeks gestation   Severe pre-eclampsia at any time during your pregnancy/labor/birth   Gestational Diabetes needing medication (insulin) to control your blood sugars   If you decline or do not complete your glucose testing to rule out diet controlled diabetes by 32 weeks   If you are diagnosed with chronic hypertension during your pregnancy and need to start medication    The options for birth where we provide  Certified Nurse Midwifery coverage with a board certified OB/GYN, in house anesthesia and  coverage are:    [facility] (Level IV maternity and  care center)  [address] [facility] (Level II maternity and  care center)  [address] [facility] (Level 1 maternity and  care center)  [place], [place]    If you would like to tour either facility:  Please call the Childbirth education line at [phone]    Danger signs to report:  Seek medical care immediately if you have pain that is doubling you over or " "vaginal bleeding that is heavier than a  period  Notify the office should you have any burning, urgency, frequency of urination or other concerning symptoms.    Work restrictions:  A normal healthy pregnancy without any complications are able to have the standard pregnancy work restrictions which is no pushing/pulling/lifting greater than 25 pounds independently    FMLA paperwork  Can be brought to the office for us to fill out for when you are starting your maternity leave (either your scheduled date of going to the hospital or your due date).  We cannot give out early FMLA when there is no documented medical conditions that are considered \"normal pregnancy\" events.    Dental care  It is very important to see a dentist during your pregnancy for routine cleaning and also if you develop any dental pain during your pregnancy.  Healthy gums and teeth are very important during your pregnancy.  We can provide you with a dental letter if your dentist would like one.    Thank you for choosing our practice and [facility] for you healthcare!  I am excited to partner with you during this very special time!     If there is anything I can do to help make your experience is positive, please come to your visits with questions and concerns and do not be afraid to ask what is on your mind!    Until then, be well!  [facility]    [facility]  [address]  [address]  [place]  [address]  [phone]    [facility]  [address]  [address]  [place] [address]  [phone]    [facility]  [address]  [address]  [place]  [address]  [phone]    [facility]  [address]  Second Floor  [place], [place]  " [address]  [phone]    [facility] at [facility]  [address] [place]  [address] [phone]    [facility] at [address] [place] [address]  [address] [phone]    [facility] at [facility]  [address]  [place]  [address] [phone]

## 2025-06-26 NOTE — PROGRESS NOTES
Subjective   Sugey Fonseca is a 24 y.o.  at 17w3d with a working estimated date of delivery of 2025, by LMP, c/w 6w4d US, who presents for an initial prenatal visit. Per previous OB provider note, patient was not properly scheduled for an initial OB appt at that time.     Patient states she is in a hurry d/t other scheduled appts for today  Concerned that she has not yet felt fetal movement.    Patient currently experiencing: occasional headaches  Bleeding or cramping since LMP: yes - cramping after falling off her bed on . Was seen and cleared in triage at that time. No longer cramping.  Taking prenatal vitamin: Yes  Ultrasound completed this pregnancy: Yes - dating and NT WNL  Last pap: 2024 NILM    OB History    Para Term  AB Living   3 1 1  1 1   SAB IAB Ectopic Multiple Live Births   1 0 0 0 1      # Outcome Date GA Lbr Dante/2nd Weight Sex Type Anes PTL Lv   3 Current            2 SAB            1 Term 10/09/19    F CS-LTranv   PRINCESS      Complications: Breech birth ([facility] Severe pre-eclampsia, delivered, with postpartum complication, PROM (premature rupture of membranes) ([facility])     Prior pregnancy complications: PROM, pLTCS for active HSV lesions  History of hypertension:  Yes, preeclampsia with SF dx'd PP    Medical History[1]   Surgical History[2]   Social History[3]   [place]  Depression Scale Total: 0    Objective   Physical Exam  Weight: 64.4 kg (142 lb)  TW.443 kg (12 lb)   Pregravid BMI: 19.19  BP: 100/72    Physical Exam  Constitutional:       Appearance: Normal appearance.   HENT:      Head: Normocephalic.   Cardiovascular:      Rate and Rhythm: Normal rate.   Pulmonary:      Effort: Pulmonary effort is normal.   Abdominal:      Palpations: Abdomen is soft.      Tenderness: There is no abdominal tenderness.   Neurological:      General: No focal deficit present.      Mental Status: She is alert and oriented to person, place, and time.    Skin:     General: Skin is warm and dry.   Psychiatric:         Mood and Affect: Mood normal.         Behavior: Behavior normal.          Assessment   Problem List Items Addressed This Visit       Genital herpes    Overview   2025  Will start ppx at 36 weeks. SG         History of  delivery affecting pregnancy ([facility])    Overview   2025 - Undecided as to how she would like to deliver. SGP   - Patient states she wants to have a repeat LTCS         17 weeks gestation of pregnancy ([facility])    Overview   Desired provider in labor: [] CNM  [] Physician   [] Either Acceptable  [] Blood Products: [] Yes, accepts [] No, needs counseling  [x] Initial BMI: 19.19   [x] Prenatal Labs: WNL  [x] Cervical Cancer Screenin2024 NILM  [x] Rh status: O POS  [] Screen for IPV and Substance Use Risk: not assessed  - partner present at visit  [x] Genetic Screening (cfDNA):  WNL, XX  [x] First Trimester Anatomy Screen (11-13.6 wks): WNL  [] Baby ASA (initiated):  [x] Pregnancy dated by: LMP c/w 6w4d US    [] Anatomy US: (19-20 wks)  [] Federal Sterilization consent signed (if indicated):  [] 1hr GCT at 24-28wks:  [] Rhogam (if indicated):   [] Fetal Surveillance (if indicated):  [] Tdap (27-32 wks, may be given up to 36 wks if initial window missed):   [] RSV (32-36 wks) (Sept. to end ):     [] Feeding Intentions:  [] Postpartum Birth control method:   [] GBS at 36 - 37 wks:  [] 39 weeks discussion of IOL vs. Expectant management:  [] Mode of delivery ( anticipated ):           Other Visit Diagnoses         Hx of pre-eclampsia in prior pregnancy, currently pregnant, second trimester ([facility])    -  Primary    Relevant Medications    aspirin 81 mg chewable tablet    Other Relevant Orders    Comprehensive Metabolic Panel - STAT    Protein, Urine Random             Plan   - New OB resources provided   - Reviewed IOM recommendations for weight gain given pt's BMI: 25-35 pounds (BMI 18.5 - 24.9)  -  Bleeding precautions, warning signs, when to call provider; phone number provided via [employer]  - Discussed bASA for PEC prophylaxis although already 17 weeks, recommend taking d/t h/o SPEC. eRx'd.   - Additional labs added: CMP and urine random protein for h/o SPEC, not previously collected  - Reviewed NOB labs previously collected WNL, cfDNA WNL  - Reviewed NT US WNL  - Tayler scan scheduled  - Return in 4 weeks for routine prenatal care    Melissa L Zahorsky, ARTEMIO-NATALIAM       [1]   Past Medical History:  Diagnosis Date    39 weeks gestation of pregnancy ([facility]) 08/07/2019    39 weeks gestation of pregnancy    Acne     Acute vaginitis 12/21/2018    Bacterial vaginosis    Adjustment disorder with mixed anxiety and depressed mood 04/27/2017    Adjustment disorder with mixed anxiety and depressed mood    Anemia 02/24/2023    Bilateral sensorineural hearing loss 02/24/2023    Chlamydial infection, unspecified 07/30/2019    Chlamydia infection    Hearing loss, bilateral 02/24/2023    Myopia, bilateral 10/07/2014    Bilateral myopia    Myopia, bilateral 11/05/2019    Myopia of both eyes with astigmatism    Pain in right shoulder 07/30/2020    Right shoulder pain    Personal history of other diseases of the circulatory system 09/06/2019    History of hypertension    Personal history of other diseases of the respiratory system 02/02/2021    History of acute pharyngitis    Personal history of other infectious and parasitic diseases 01/07/2019    History of sexually transmitted disease    Presence of spectacles and contact lenses     Wears glasses    Presence of spectacles and contact lenses     Wears contact lenses    Puncture wound of abdominal wall without foreign body, periumbilic region without penetration into peritoneal cavity, initial encounter 02/02/2021    Pierced belly button infection    Streptococcus, group b, as the cause of diseases classified elsewhere 08/07/2019    Positive GBS test    Supervision of pregnancy  with insufficient  care, second trimester ([facility]) 2019    Late prenatal care in second trimester    Unspecified astigmatism, right eye 10/07/2014    Astigmatism of right eye    Urinary tract infection, site not specified 2020    Acute lower UTI    Verruca     Vitamin D deficiency 2023    Weight loss, unintentional 2023   [2]   Past Surgical History:  Procedure Laterality Date     SECTION, CLASSIC  2019    DILATION AND CURETTAGE OF UTERUS  2023    OTHER SURGICAL HISTORY  2020     section   [3]   Social History  Socioeconomic History    Marital status: Single   Tobacco Use    Smoking status: Never     Passive exposure: Never    Smokeless tobacco: Never   Vaping Use    Vaping status: Former    Substances: Nicotine, Flavoring    Devices: Disposable   Substance and Sexual Activity    Alcohol use: Not Currently     Comment: occasionally    Drug use: Never    Sexual activity: Yes     Partners: Male     Birth control/protection: None

## 2025-07-09 ENCOUNTER — HOSPITAL ENCOUNTER (OUTPATIENT)
Dept: RADIOLOGY | Facility: CLINIC | Age: 25
Discharge: HOME | End: 2025-07-09
Payer: COMMERCIAL

## 2025-07-09 DIAGNOSIS — Z34.80 PRENATAL CARE, SUBSEQUENT PREGNANCY, ANTEPARTUM: ICD-10-CM

## 2025-07-09 PROCEDURE — 76811 OB US DETAILED SNGL FETUS: CPT

## 2025-07-09 PROCEDURE — 76811 OB US DETAILED SNGL FETUS: CPT | Performed by: OBSTETRICS & GYNECOLOGY

## 2025-07-16 ENCOUNTER — APPOINTMENT (OUTPATIENT)
Dept: DERMATOLOGY | Facility: CLINIC | Age: 25
End: 2025-07-16
Payer: COMMERCIAL

## 2025-07-18 ENCOUNTER — APPOINTMENT (OUTPATIENT)
Dept: DERMATOLOGY | Facility: CLINIC | Age: 25
End: 2025-07-18
Payer: COMMERCIAL

## 2025-07-23 ENCOUNTER — APPOINTMENT (OUTPATIENT)
Dept: OBSTETRICS AND GYNECOLOGY | Facility: CLINIC | Age: 25
End: 2025-07-23
Payer: COMMERCIAL

## 2025-07-23 VITALS — DIASTOLIC BLOOD PRESSURE: 60 MMHG | SYSTOLIC BLOOD PRESSURE: 104 MMHG | WEIGHT: 145 LBS | BODY MASS INDEX: 21.41 KG/M2

## 2025-07-23 DIAGNOSIS — R12 HEARTBURN DURING PREGNANCY IN SECOND TRIMESTER (HHS-HCC): ICD-10-CM

## 2025-07-23 DIAGNOSIS — O09.92 ENCOUNTER FOR SUPERVISION OF HIGH RISK PREGNANCY IN SECOND TRIMESTER, ANTEPARTUM: Primary | ICD-10-CM

## 2025-07-23 DIAGNOSIS — O26.892 HEARTBURN DURING PREGNANCY IN SECOND TRIMESTER (HHS-HCC): ICD-10-CM

## 2025-07-23 DIAGNOSIS — O34.219 HISTORY OF CESAREAN DELIVERY AFFECTING PREGNANCY (HHS-HCC): ICD-10-CM

## 2025-07-23 DIAGNOSIS — O09.292: ICD-10-CM

## 2025-07-23 DIAGNOSIS — A60.00 HERPES SIMPLEX INFECTION OF GENITOURINARY SYSTEM: ICD-10-CM

## 2025-07-23 DIAGNOSIS — Z3A.21 21 WEEKS GESTATION OF PREGNANCY (HHS-HCC): ICD-10-CM

## 2025-07-23 PROCEDURE — 99214 OFFICE O/P EST MOD 30 MIN: CPT | Performed by: OBSTETRICS & GYNECOLOGY

## 2025-07-23 RX ORDER — OMEPRAZOLE 20 MG/1
20 CAPSULE, DELAYED RELEASE ORAL DAILY
Qty: 30 CAPSULE | Refills: 0 | Status: SHIPPED | OUTPATIENT
Start: 2025-07-23 | End: 2026-07-23

## 2025-07-23 NOTE — PROGRESS NOTES
She reports some flutters.  She is without symptoms of  labor or preeclampsia, although she does note some intermittent cramping.    She reports some reflux symptoms at night.    She is still considering  versus the repeat  section.  Her first section was for breech presentation.    A/P: HROB - h/o PEC w/prior pregnancy, h/o CD, h/o HSV    -  RTC 4 weeks     -  Second tri labs at next visit    -  Prilosec prn

## 2025-07-24 ENCOUNTER — APPOINTMENT (OUTPATIENT)
Dept: OBSTETRICS AND GYNECOLOGY | Facility: CLINIC | Age: 25
End: 2025-07-24
Payer: COMMERCIAL

## 2025-08-01 ENCOUNTER — APPOINTMENT (OUTPATIENT)
Dept: DERMATOLOGY | Facility: CLINIC | Age: 25
End: 2025-08-01
Payer: COMMERCIAL

## 2025-08-13 ENCOUNTER — APPOINTMENT (OUTPATIENT)
Dept: DERMATOLOGY | Facility: CLINIC | Age: 25
End: 2025-08-13
Payer: COMMERCIAL

## 2025-08-14 ENCOUNTER — APPOINTMENT (OUTPATIENT)
Dept: OBSTETRICS AND GYNECOLOGY | Facility: CLINIC | Age: 25
End: 2025-08-14
Payer: COMMERCIAL

## 2025-08-15 ENCOUNTER — APPOINTMENT (OUTPATIENT)
Dept: OBSTETRICS AND GYNECOLOGY | Facility: CLINIC | Age: 25
End: 2025-08-15
Payer: COMMERCIAL

## 2025-08-19 ENCOUNTER — APPOINTMENT (OUTPATIENT)
Dept: OBSTETRICS AND GYNECOLOGY | Facility: CLINIC | Age: 25
End: 2025-08-19
Payer: COMMERCIAL

## 2025-08-20 ENCOUNTER — APPOINTMENT (OUTPATIENT)
Dept: OBSTETRICS AND GYNECOLOGY | Facility: CLINIC | Age: 25
End: 2025-08-20
Payer: COMMERCIAL

## 2025-08-20 ENCOUNTER — APPOINTMENT (OUTPATIENT)
Dept: DERMATOLOGY | Facility: CLINIC | Age: 25
End: 2025-08-20
Payer: COMMERCIAL

## 2025-08-20 ENCOUNTER — OFFICE VISIT (OUTPATIENT)
Dept: DERMATOLOGY | Facility: CLINIC | Age: 25
End: 2025-08-20
Payer: COMMERCIAL

## 2025-08-20 DIAGNOSIS — B07.9 VIRAL WARTS, UNSPECIFIED TYPE: ICD-10-CM

## 2025-08-20 DIAGNOSIS — L70.0 ACNE VULGARIS: Primary | ICD-10-CM

## 2025-08-20 DIAGNOSIS — R20.9 DISTURBANCE OF SKIN SENSATION: ICD-10-CM

## 2025-08-20 PROCEDURE — 17110 DESTRUCTION B9 LES UP TO 14: CPT | Performed by: DERMATOLOGY

## 2025-08-20 PROCEDURE — 1036F TOBACCO NON-USER: CPT | Performed by: DERMATOLOGY

## 2025-08-20 PROCEDURE — 99214 OFFICE O/P EST MOD 30 MIN: CPT | Performed by: DERMATOLOGY

## 2025-08-20 RX ORDER — AZELAIC ACID 0.15 G/G
1 GEL TOPICAL 2 TIMES DAILY
Qty: 50 G | Refills: 1 | Status: SHIPPED | OUTPATIENT
Start: 2025-08-20

## 2025-08-20 RX ORDER — CLINDAMYCIN PHOSPHATE 10 UG/ML
LOTION TOPICAL
Qty: 60 ML | Refills: 11 | Status: SHIPPED | OUTPATIENT
Start: 2025-08-20

## 2025-08-20 ASSESSMENT — DERMATOLOGY PATIENT ASSESSMENT
ARE YOU AN ORGAN TRANSPLANT RECIPIENT: NO
ARE YOU ON BIRTH CONTROL: NO
DO YOU HAVE ANY NEW OR CHANGING LESIONS: NO
DO YOU USE A TANNING BED: NO
HAVE YOU HAD OR DO YOU HAVE VASCULAR DISEASE: NO
ARE YOU TRYING TO GET PREGNANT: NO
HAVE YOU HAD OR DO YOU HAVE A STAPH INFECTION: NO
DO YOU USE SUNSCREEN: OCCASIONALLY
DO YOU HAVE IRREGULAR MENSTRUAL CYCLES: NO

## 2025-08-20 ASSESSMENT — DERMATOLOGY QUALITY OF LIFE (QOL) ASSESSMENT
RATE HOW EMOTIONALLY BOTHERED YOU ARE BY YOUR SKIN PROBLEM (FOR EXAMPLE, WORRY, EMBARRASSMENT, FRUSTRATION): 0 - NEVER BOTHERED
ARE THERE EXCLUSIONS OR EXCEPTIONS FOR THE QUALITY OF LIFE ASSESSMENT: NO
DATE THE QUALITY-OF-LIFE ASSESSMENT WAS COMPLETED: 67437
RATE HOW BOTHERED YOU ARE BY SYMPTOMS OF YOUR SKIN PROBLEM (EG, ITCHING, STINGING BURNING, HURTING OR SKIN IRRITATION): 0 - NEVER BOTHERED
RATE HOW BOTHERED YOU ARE BY EFFECTS OF YOUR SKIN PROBLEMS ON YOUR ACTIVITIES (EG, GOING OUT, ACCOMPLISHING WHAT YOU WANT, WORK ACTIVITIES OR YOUR RELATIONSHIPS WITH OTHERS): 0 - NEVER BOTHERED

## 2025-08-20 ASSESSMENT — ITCH NUMERIC RATING SCALE: HOW SEVERE IS YOUR ITCHING?: 0

## 2025-08-20 ASSESSMENT — PATIENT GLOBAL ASSESSMENT (PGA): PATIENT GLOBAL ASSESSMENT: PATIENT GLOBAL ASSESSMENT:  1 - CLEAR

## 2025-08-21 ENCOUNTER — ROUTINE PRENATAL (OUTPATIENT)
Dept: OBSTETRICS AND GYNECOLOGY | Facility: CLINIC | Age: 25
End: 2025-08-21
Payer: COMMERCIAL

## 2025-08-21 VITALS — DIASTOLIC BLOOD PRESSURE: 62 MMHG | BODY MASS INDEX: 22.39 KG/M2 | WEIGHT: 151.6 LBS | SYSTOLIC BLOOD PRESSURE: 96 MMHG

## 2025-08-21 DIAGNOSIS — O34.219 HISTORY OF CESAREAN DELIVERY AFFECTING PREGNANCY (HHS-HCC): ICD-10-CM

## 2025-08-21 DIAGNOSIS — Z87.59 HISTORY OF SEVERE PRE-ECLAMPSIA: ICD-10-CM

## 2025-08-21 DIAGNOSIS — A60.04 HERPES SIMPLEX VULVOVAGINITIS: ICD-10-CM

## 2025-08-21 DIAGNOSIS — Z3A.25 25 WEEKS GESTATION OF PREGNANCY (HHS-HCC): Primary | ICD-10-CM

## 2025-08-21 DIAGNOSIS — N90.89 VULVAR IRRITATION: ICD-10-CM

## 2025-08-21 DIAGNOSIS — N89.8 VAGINAL DISCHARGE: ICD-10-CM

## 2025-08-21 PROCEDURE — 99211 OFF/OP EST MAY X REQ PHY/QHP: CPT

## 2025-08-21 PROCEDURE — 99214 OFFICE O/P EST MOD 30 MIN: CPT | Performed by: NURSE PRACTITIONER

## 2025-08-21 RX ORDER — TRIAMCINOLONE ACETONIDE 5 MG/G
OINTMENT TOPICAL 2 TIMES DAILY PRN
Qty: 15 G | Refills: 0 | Status: SHIPPED | OUTPATIENT
Start: 2025-08-21 | End: 2026-08-21

## 2025-08-21 RX ORDER — TERCONAZOLE 4 MG/G
1 CREAM VAGINAL NIGHTLY
Qty: 45 G | Refills: 0 | Status: SHIPPED | OUTPATIENT
Start: 2025-08-21 | End: 2025-08-28

## 2025-08-22 LAB — BV SCORE VAG QL: NORMAL

## 2025-08-25 ENCOUNTER — RESULTS FOLLOW-UP (OUTPATIENT)
Dept: OBSTETRICS AND GYNECOLOGY | Facility: CLINIC | Age: 25
End: 2025-08-25
Payer: COMMERCIAL

## 2025-08-27 ENCOUNTER — APPOINTMENT (OUTPATIENT)
Dept: DERMATOLOGY | Facility: CLINIC | Age: 25
End: 2025-08-27
Payer: COMMERCIAL

## 2025-09-05 ENCOUNTER — APPOINTMENT (OUTPATIENT)
Dept: DERMATOLOGY | Facility: CLINIC | Age: 25
End: 2025-09-05
Payer: COMMERCIAL

## 2025-09-11 ENCOUNTER — APPOINTMENT (OUTPATIENT)
Dept: OBSTETRICS AND GYNECOLOGY | Facility: CLINIC | Age: 25
End: 2025-09-11
Payer: COMMERCIAL

## 2025-09-12 ENCOUNTER — APPOINTMENT (OUTPATIENT)
Dept: OBSTETRICS AND GYNECOLOGY | Facility: CLINIC | Age: 25
End: 2025-09-12
Payer: COMMERCIAL

## 2025-09-19 ENCOUNTER — APPOINTMENT (OUTPATIENT)
Dept: DERMATOLOGY | Facility: CLINIC | Age: 25
End: 2025-09-19
Payer: COMMERCIAL

## 2025-09-30 ENCOUNTER — APPOINTMENT (OUTPATIENT)
Dept: DERMATOLOGY | Facility: CLINIC | Age: 25
End: 2025-09-30
Payer: COMMERCIAL